# Patient Record
Sex: FEMALE | Race: BLACK OR AFRICAN AMERICAN | NOT HISPANIC OR LATINO | Employment: FULL TIME | URBAN - METROPOLITAN AREA
[De-identification: names, ages, dates, MRNs, and addresses within clinical notes are randomized per-mention and may not be internally consistent; named-entity substitution may affect disease eponyms.]

---

## 2019-07-30 DIAGNOSIS — R94.5 NONSPECIFIC ABNORMAL RESULTS OF LIVER FUNCTION STUDY: Primary | ICD-10-CM

## 2019-08-09 ENCOUNTER — HOSPITAL ENCOUNTER (OUTPATIENT)
Dept: RADIOLOGY | Facility: HOSPITAL | Age: 49
Discharge: HOME | End: 2019-08-09
Attending: FAMILY MEDICINE
Payer: MEDICAID

## 2019-08-09 DIAGNOSIS — R94.5 NONSPECIFIC ABNORMAL RESULTS OF LIVER FUNCTION STUDY: ICD-10-CM

## 2019-08-09 PROCEDURE — 76700 US ABDOMEN COMPLETE: ICD-10-PCS | Mod: 26,,, | Performed by: RADIOLOGY

## 2019-08-09 PROCEDURE — 76700 US EXAM ABDOM COMPLETE: CPT | Mod: TC,PO

## 2019-08-09 PROCEDURE — 76700 US EXAM ABDOM COMPLETE: CPT | Mod: 26,,, | Performed by: RADIOLOGY

## 2019-10-18 ENCOUNTER — PATIENT MESSAGE (OUTPATIENT)
Dept: FAMILY MEDICINE | Facility: CLINIC | Age: 49
End: 2019-10-18

## 2019-10-18 DIAGNOSIS — K76.0 HEPATIC STEATOSIS: ICD-10-CM

## 2019-10-18 DIAGNOSIS — E11.29 TYPE 2 DIABETES MELLITUS WITH OTHER DIABETIC KIDNEY COMPLICATION: ICD-10-CM

## 2019-10-18 DIAGNOSIS — E03.9 ACQUIRED HYPOTHYROIDISM: ICD-10-CM

## 2019-10-18 DIAGNOSIS — I10 ESSENTIAL HYPERTENSION: Primary | ICD-10-CM

## 2019-10-21 PROBLEM — E03.9 ACQUIRED HYPOTHYROIDISM: Status: ACTIVE | Noted: 2017-10-11

## 2019-10-21 PROBLEM — Z79.4 LONG TERM CURRENT USE OF INSULIN: Status: ACTIVE | Noted: 2017-10-11

## 2019-10-21 PROBLEM — B00.9 HSV-1 (HERPES SIMPLEX VIRUS 1) INFECTION: Status: ACTIVE | Noted: 2017-10-11

## 2019-10-21 PROBLEM — J30.2 OTHER SEASONAL ALLERGIC RHINITIS: Status: ACTIVE | Noted: 2017-10-11

## 2019-10-21 PROBLEM — E66.01 OBESITIES, MORBID: Status: ACTIVE | Noted: 2017-10-11

## 2019-10-21 PROBLEM — K76.0 HEPATIC STEATOSIS: Status: ACTIVE | Noted: 2019-08-13

## 2019-10-21 PROBLEM — F33.0 MAJOR DEPRESSIVE DISORDER, RECURRENT, MILD: Status: ACTIVE | Noted: 2017-10-11

## 2019-10-21 PROBLEM — I10 ESSENTIAL HYPERTENSION: Status: ACTIVE | Noted: 2017-10-11

## 2019-10-21 PROBLEM — E11.29 TYPE 2 DIABETES MELLITUS WITH OTHER DIABETIC KIDNEY COMPLICATION: Status: ACTIVE | Noted: 2018-03-29

## 2019-10-21 RX ORDER — INSULIN GLARGINE 100 [IU]/ML
60 INJECTION, SOLUTION SUBCUTANEOUS DAILY
COMMUNITY
Start: 2019-10-13 | End: 2019-12-11 | Stop reason: SDUPTHER

## 2019-10-21 RX ORDER — EMPAGLIFLOZIN 25 MG/1
1 TABLET, FILM COATED ORAL DAILY
COMMUNITY
Start: 2019-10-05 | End: 2019-12-11 | Stop reason: SDUPTHER

## 2019-10-21 RX ORDER — BUPROPION HYDROCHLORIDE 300 MG/1
TABLET ORAL
COMMUNITY
Start: 2019-10-13 | End: 2019-12-11 | Stop reason: SDUPTHER

## 2019-10-21 RX ORDER — LIRAGLUTIDE 6 MG/ML
INJECTION SUBCUTANEOUS
COMMUNITY
Start: 2019-09-25 | End: 2020-03-11

## 2019-10-21 RX ORDER — AMLODIPINE BESYLATE 5 MG/1
1 TABLET ORAL DAILY
COMMUNITY
Start: 2019-08-28 | End: 2019-10-21 | Stop reason: SDUPTHER

## 2019-10-21 RX ORDER — PEN NEEDLE, DIABETIC 31 GX5/16"
NEEDLE, DISPOSABLE MISCELLANEOUS
COMMUNITY
Start: 2019-09-15 | End: 2020-03-13 | Stop reason: SDUPTHER

## 2019-10-21 RX ORDER — AMLODIPINE BESYLATE 5 MG/1
5 TABLET ORAL DAILY
Qty: 30 TABLET | Refills: 0 | Status: SHIPPED | OUTPATIENT
Start: 2019-10-21 | End: 2019-11-25 | Stop reason: SDUPTHER

## 2019-10-21 RX ORDER — LEVOTHYROXINE SODIUM 50 UG/1
TABLET ORAL
COMMUNITY
Start: 2019-09-23 | End: 2019-12-11 | Stop reason: SDUPTHER

## 2019-10-21 RX ORDER — FLUTICASONE PROPIONATE 50 MCG
2 SPRAY, SUSPENSION (ML) NASAL
COMMUNITY

## 2019-10-21 RX ORDER — LOSARTAN POTASSIUM 100 MG/1
TABLET ORAL
COMMUNITY
Start: 2019-09-23 | End: 2019-11-05 | Stop reason: SDUPTHER

## 2019-10-21 RX ORDER — ERYTHROMYCIN 5 MG/G
OINTMENT OPHTHALMIC
COMMUNITY
Start: 2019-10-17 | End: 2019-12-11

## 2019-10-21 RX ORDER — BACLOFEN 10 MG/1
10 TABLET ORAL 3 TIMES DAILY
COMMUNITY
Start: 2019-10-13 | End: 2019-12-11 | Stop reason: SDUPTHER

## 2019-10-21 NOTE — TELEPHONE ENCOUNTER
Below message copied from previous encounter.  Unable to locate/open previous encounter.  To dr wisdom for review-->Good afternoon.     In preparation for my  appointment with Dr. Wisdom can you please [facility]?     Also, my Amlodopine prescription had one (1) refill remaining but it  at the end of September. I was supposed to fill it prior to its expiration but completely forgot and am now out of the medication. Is it possible to send a refill to [facility] ([place], [place]) although I will not see Dr. Wisdom for another two (2) weeks? Thank you so much.     Have a good afternoon.     -Demetrice

## 2019-10-31 ENCOUNTER — PATIENT MESSAGE (OUTPATIENT)
Dept: FAMILY MEDICINE | Facility: CLINIC | Age: 49
End: 2019-10-31

## 2019-11-05 DIAGNOSIS — I10 ESSENTIAL HYPERTENSION: Primary | ICD-10-CM

## 2019-11-05 RX ORDER — LOSARTAN POTASSIUM 100 MG/1
100 TABLET ORAL DAILY
Qty: 90 TABLET | Refills: 0 | Status: SHIPPED | OUTPATIENT
Start: 2019-11-05 | End: 2019-12-11 | Stop reason: SDUPTHER

## 2019-11-05 NOTE — TELEPHONE ENCOUNTER
prescription sent to   [id number] - [place] - [address]  [address]  [place] [address]  Phone: [phone] Fax: [phone]

## 2019-11-05 NOTE — TELEPHONE ENCOUNTER
----- Message from Deborah Patel sent at 11/5/2019  2:12 PM CST -----  Pt has an appointment for 12/11. She has run out of her medication for Losartan 100 mg. Please call an Rx in for her. [facility]

## 2019-11-20 ENCOUNTER — TELEPHONE (OUTPATIENT)
Dept: FAMILY MEDICINE | Facility: CLINIC | Age: 49
End: 2019-11-20

## 2019-11-20 NOTE — TELEPHONE ENCOUNTER
Spoke to patient that fasting lab is due prior to 12/11 ov. States she will go a week before visit.

## 2019-11-24 ENCOUNTER — PATIENT MESSAGE (OUTPATIENT)
Dept: FAMILY MEDICINE | Facility: CLINIC | Age: 49
End: 2019-11-24

## 2019-11-24 DIAGNOSIS — I10 ESSENTIAL HYPERTENSION: ICD-10-CM

## 2019-11-25 RX ORDER — AMLODIPINE BESYLATE 5 MG/1
5 TABLET ORAL DAILY
Qty: 30 TABLET | Refills: 0 | Status: SHIPPED | OUTPATIENT
Start: 2019-11-25 | End: 2019-12-11 | Stop reason: SDUPTHER

## 2019-11-25 NOTE — TELEPHONE ENCOUNTER
prescription sent to   [id number] - [place] - [address]  [address]  [place] [address]  Phone: [phone] Fax: [phone]

## 2019-12-10 LAB
ALBUMIN SERPL-MCNC: 4.9 G/DL (ref 3.5–5.5)
ALBUMIN/CREAT UR: 102 MG/G CREAT (ref 0–30)
ALBUMIN/GLOB SERPL: 1.7 {RATIO} (ref 1.2–2.2)
ALP SERPL-CCNC: 74 IU/L (ref 39–117)
ALT SERPL-CCNC: 106 IU/L (ref 0–32)
APPEARANCE UR: ABNORMAL
AST SERPL-CCNC: 67 IU/L (ref 0–40)
BACTERIA #/AREA URNS HPF: ABNORMAL /[HPF]
BILIRUB SERPL-MCNC: 0.6 MG/DL (ref 0–1.2)
BILIRUB UR QL STRIP: NEGATIVE
BUN SERPL-MCNC: 14 MG/DL (ref 6–24)
BUN/CREAT SERPL: 14 (ref 9–23)
CALCIUM SERPL-MCNC: 10.3 MG/DL (ref 8.7–10.2)
CHLORIDE SERPL-SCNC: 98 MMOL/L (ref 96–106)
CHOLEST SERPL-MCNC: 176 MG/DL (ref 100–199)
CO2 SERPL-SCNC: 23 MMOL/L (ref 20–29)
COLOR UR: YELLOW
CREAT SERPL-MCNC: 1 MG/DL (ref 0.57–1)
CREAT UR-MCNC: 121.2 MG/DL
EPI CELLS #/AREA URNS HPF: >10 /HPF (ref 0–10)
GLOBULIN SER CALC-MCNC: 2.9 G/DL (ref 1.5–4.5)
GLUCOSE SERPL-MCNC: 115 MG/DL (ref 65–99)
GLUCOSE UR QL: ABNORMAL
HDLC SERPL-MCNC: 72 MG/DL
HGB UR QL STRIP: NEGATIVE
KETONES UR QL STRIP: NEGATIVE
LDLC SERPL CALC-MCNC: 83 MG/DL (ref 0–99)
LEUKOCYTE ESTERASE UR QL STRIP: NEGATIVE
MICRO URNS: ABNORMAL
MICROALBUMIN UR-MCNC: 123.6 UG/ML
MUCOUS THREADS URNS QL MICRO: PRESENT
NITRITE UR QL STRIP: NEGATIVE
PH UR STRIP: 6.5 [PH] (ref 5–7.5)
POTASSIUM SERPL-SCNC: 4.2 MMOL/L (ref 3.5–5.2)
PROT SERPL-MCNC: 7.8 G/DL (ref 6–8.5)
PROT UR QL STRIP: ABNORMAL
RBC #/AREA URNS HPF: ABNORMAL /HPF (ref 0–2)
SODIUM SERPL-SCNC: 138 MMOL/L (ref 134–144)
SP GR UR: >=1.03 (ref 1–1.03)
T4 FREE SERPL-MCNC: 1.3 NG/DL (ref 0.82–1.77)
TRIGL SERPL-MCNC: 106 MG/DL (ref 0–149)
TSH SERPL DL<=0.005 MIU/L-ACNC: 3.81 UIU/ML (ref 0.45–4.5)
URINALYSIS REFLEX: ABNORMAL
UROBILINOGEN UR STRIP-MCNC: 0.2 MG/DL (ref 0.2–1)
VLDLC SERPL CALC-MCNC: 21 MG/DL (ref 5–40)
WBC #/AREA URNS HPF: ABNORMAL /HPF (ref 0–5)

## 2019-12-11 ENCOUNTER — OFFICE VISIT (OUTPATIENT)
Dept: FAMILY MEDICINE | Facility: CLINIC | Age: 49
End: 2019-12-11
Payer: MEDICAID

## 2019-12-11 VITALS
DIASTOLIC BLOOD PRESSURE: 60 MMHG | HEIGHT: 66 IN | BODY MASS INDEX: 45 KG/M2 | HEART RATE: 76 BPM | SYSTOLIC BLOOD PRESSURE: 124 MMHG | WEIGHT: 280 LBS

## 2019-12-11 DIAGNOSIS — I10 ESSENTIAL HYPERTENSION: ICD-10-CM

## 2019-12-11 DIAGNOSIS — Z79.4 LONG TERM CURRENT USE OF INSULIN: ICD-10-CM

## 2019-12-11 DIAGNOSIS — K76.0 HEPATIC STEATOSIS: ICD-10-CM

## 2019-12-11 DIAGNOSIS — E11.29 TYPE 2 DIABETES MELLITUS WITH OTHER DIABETIC KIDNEY COMPLICATION: Primary | ICD-10-CM

## 2019-12-11 DIAGNOSIS — F33.0 MAJOR DEPRESSIVE DISORDER, RECURRENT, MILD: ICD-10-CM

## 2019-12-11 DIAGNOSIS — R79.89 ELEVATED LFTS: ICD-10-CM

## 2019-12-11 DIAGNOSIS — E03.9 ACQUIRED HYPOTHYROIDISM: ICD-10-CM

## 2019-12-11 LAB — HBA1C MFR BLD: 8.5 %

## 2019-12-11 PROCEDURE — 99214 OFFICE O/P EST MOD 30 MIN: CPT | Mod: S$GLB,,, | Performed by: FAMILY MEDICINE

## 2019-12-11 PROCEDURE — 99214 PR OFFICE/OUTPT VISIT, EST, LEVL IV, 30-39 MIN: ICD-10-PCS | Mod: S$GLB,,, | Performed by: FAMILY MEDICINE

## 2019-12-11 PROCEDURE — 83036 HEMOGLOBIN GLYCOSYLATED A1C: CPT | Mod: QW,,, | Performed by: FAMILY MEDICINE

## 2019-12-11 PROCEDURE — 83036 POCT HEMOGLOBIN A1C: ICD-10-PCS | Mod: QW,,, | Performed by: FAMILY MEDICINE

## 2019-12-11 RX ORDER — INSULIN GLARGINE 100 [IU]/ML
60 INJECTION, SOLUTION SUBCUTANEOUS DAILY
Qty: 18 ML | Refills: 11 | Status: SHIPPED | OUTPATIENT
Start: 2019-12-11 | End: 2020-01-29 | Stop reason: SDUPTHER

## 2019-12-11 RX ORDER — LEVOTHYROXINE SODIUM 50 UG/1
50 TABLET ORAL
Qty: 30 TABLET | Refills: 12 | Status: SHIPPED | OUTPATIENT
Start: 2019-12-11 | End: 2020-05-11 | Stop reason: SDUPTHER

## 2019-12-11 RX ORDER — AMLODIPINE BESYLATE 5 MG/1
5 TABLET ORAL DAILY
Qty: 30 TABLET | Refills: 12 | Status: SHIPPED | OUTPATIENT
Start: 2019-12-11 | End: 2020-05-11 | Stop reason: SDUPTHER

## 2019-12-11 RX ORDER — EMPAGLIFLOZIN 25 MG/1
1 TABLET, FILM COATED ORAL DAILY
Qty: 30 TABLET | Refills: 12 | Status: SHIPPED | OUTPATIENT
Start: 2019-12-11 | End: 2020-05-11 | Stop reason: SDUPTHER

## 2019-12-11 RX ORDER — BACLOFEN 10 MG/1
10 TABLET ORAL 3 TIMES DAILY
Qty: 90 TABLET | Refills: 12 | Status: SHIPPED | OUTPATIENT
Start: 2019-12-11 | End: 2020-05-11 | Stop reason: SDUPTHER

## 2019-12-11 RX ORDER — LOSARTAN POTASSIUM 100 MG/1
50 TABLET ORAL 2 TIMES DAILY
Qty: 60 TABLET | Refills: 12 | Status: SHIPPED | OUTPATIENT
Start: 2019-12-11 | End: 2020-03-03 | Stop reason: SDUPTHER

## 2019-12-11 RX ORDER — BUPROPION HYDROCHLORIDE 300 MG/1
300 TABLET ORAL DAILY
Qty: 30 TABLET | Refills: 12 | Status: SHIPPED | OUTPATIENT
Start: 2019-12-11 | End: 2020-07-28 | Stop reason: SDUPTHER

## 2019-12-11 NOTE — PROGRESS NOTES
SUBJECTIVE:    Patient ID: Demetrice Palmer is a 49 y.o. female.    Chief Complaint: Regular Check Up; check ear; and discuss taking over meds from psych    Patient with hypertension and type 2 diabetes presents for visit.  Labs also reviewed does show elevated fasting blood sugar however renal function is normal.  Lipids are normal.  TSH is also normal.  Patient is hypothyroid on medication.  Her liver function tests still remain slightly elevated.  Hepatitis testing is negative.  Ultrasound only demonstrates a fatty liver.  Urine shows 3+ glucose but this is consistent with her use of jardiance  meds.  Positive microalbuminuria.  Diabetes controlled is still lacking.  Has been having issues with ear pain, this has been an issues for a while . Is secondary to blocked salivary glands. Has been sucking on sour candy and pain has improves  Has previously been seen in a psych program for drug rehab, has been clean and sober for 10 years and no longer feels that this program is meeting her needs.  She continues on her medications and has been stable.  Mood is stable.  Plans  to be entering the work force against symptoms.    Office Visit on 12/11/2019   Component Date Value Ref Range Status    Hemoglobin A1C 12/11/2019 8.5  % Final   Patient Message on 10/18/2019   Component Date Value Ref Range Status    Glucose 12/09/2019 115* 65 - 99 mg/dL Final    BUN, Bld 12/09/2019 14  6 - 24 mg/dL Final    Creatinine 12/09/2019 1.00  0.57 - 1.00 mg/dL Final    eGFR if non African American 12/09/2019 67  >59 mL/min/1.73 Final    eGFR if  12/09/2019 77  >59 mL/min/1.73 Final    BUN/Creatinine Ratio 12/09/2019 14  9 - 23 Final    Sodium 12/09/2019 138  134 - 144 mmol/L Final    Potassium 12/09/2019 4.2  3.5 - 5.2 mmol/L Final    Chloride 12/09/2019 98  96 - 106 mmol/L Final    CO2 12/09/2019 23  20 - 29 mmol/L Final    Calcium 12/09/2019 10.3* 8.7 - 10.2 mg/dL Final    Total Protein 12/09/2019 7.8   6.0 - 8.5 g/dL Final    Albumin 12/09/2019 4.9  3.5 - 5.5 g/dL Final    Globulin, Total 12/09/2019 2.9  1.5 - 4.5 g/dL Final    Albumin/Globulin Ratio 12/09/2019 1.7  1.2 - 2.2 Final    Total Bilirubin 12/09/2019 0.6  0.0 - 1.2 mg/dL Final    Alkaline Phosphatase 12/09/2019 74  39 - 117 IU/L Final    AST 12/09/2019 67* 0 - 40 IU/L Final    ALT 12/09/2019 106* 0 - 32 IU/L Final    Cholesterol 12/09/2019 176  100 - 199 mg/dL Final    Triglycerides 12/09/2019 106  0 - 149 mg/dL Final    HDL 12/09/2019 72  >39 mg/dL Final    VLDL Cholesterol Adam 12/09/2019 21  5 - 40 mg/dL Final    LDL Calculated 12/09/2019 83  0 - 99 mg/dL Final    TSH 12/09/2019 3.810  0.450 - 4.500 uIU/mL Final    T4, Free 12/09/2019 1.30  0.82 - 1.77 ng/dL Final    Creatinine, Random Ur 12/09/2019 121.2  Not Estab. mg/dL Final    Microalb, Ur 12/09/2019 123.6  Not Estab. ug/mL Final    Microalb/Crt. Ratio 12/09/2019 102.0* 0.0 - 30.0 mg/g creat Final    Specific Gravity, UA 12/09/2019 >=1.030* 1.005 - 1.030 Final    pH, UA 12/09/2019 6.5  5.0 - 7.5 Final    Color, UA 12/09/2019 Yellow  Yellow Final    Clarity, UA 12/09/2019 Cloudy* Clear Final    Leukocytes, UA 12/09/2019 Negative  Negative Final    Protein, UA 12/09/2019 1+* Negative/Trace Final    Glucose, UA 12/09/2019 3+* Negative Final    Ketones, UA 12/09/2019 Negative  Negative Final    Occult Blood UA 12/09/2019 Negative  Negative Final    Bilirubin, UA 12/09/2019 Negative  Negative Final    Urobilinogen, UA 12/09/2019 0.2  0.2 - 1.0 mg/dL Final    Nitrite, UA 12/09/2019 Negative  Negative Final    Microscopic Examination 12/09/2019 See below:   Final    Urinalysis Reflex 12/09/2019 Comment   Final    WBC, UA 12/09/2019 0-5  0 - 5 /hpf Final    RBC, UA 12/09/2019 0-2  0 - 2 /hpf Final    Epithelial Cells (non renal) 12/09/2019 >10* 0 - 10 /hpf Final    Mucus, UA 12/09/2019 Present  Not Estab. Final    Bacteria, UA 12/09/2019 None seen  None seen/Few Final  "    History reviewed. No pertinent past medical history.  History reviewed. No pertinent surgical history.  History reviewed. No pertinent family history.    Marital Status: Single  Alcohol History:  reports that she drinks alcohol.  Tobacco History:  reports that she quit smoking about 9 years ago. She has never used smokeless tobacco.  Drug History:  reports that she does not use drugs.    Review of patient's allergies indicates:   Allergen Reactions    Lisinopril      Other reaction(s): COUGH  Cough  Cough  Cough         Current Outpatient Medications:     amLODIPine (NORVASC) 5 MG tablet, Take 1 tablet (5 mg total) by mouth once daily., Disp: 30 tablet, Rfl: 12    baclofen (LIORESAL) 10 MG tablet, Take 1 tablet (10 mg total) by mouth 3 (three) times daily., Disp: 90 tablet, Rfl: 12    buPROPion (WELLBUTRIN XL) 300 MG 24 hr tablet, Take 1 tablet (300 mg total) by mouth once daily., Disp: 30 tablet, Rfl: 12    JARDIANCE 25 mg Tab, Take 1 tablet by mouth once daily., Disp: 30 tablet, Rfl: 12    LANTUS SOLOSTAR U-100 INSULIN glargine 100 units/mL (3mL) SubQ pen, Inject 60 Units into the skin once daily., Disp: 18 mL, Rfl: 11    levothyroxine (SYNTHROID) 50 MCG tablet, Take 1 tablet (50 mcg total) by mouth before breakfast., Disp: 30 tablet, Rfl: 12    losartan (COZAAR) 100 MG tablet, Take 0.5 tablets (50 mg total) by mouth 2 (two) times daily., Disp: 60 tablet, Rfl: 12    VICTOZA 3-GILMER 0.6 mg/0.1 mL (18 mg/3 mL) PnIj, , Disp: , Rfl:     BD ULTRA-FINE JENNIFER PEN NEEDLE 32 gauge x 5/32" Ndle, , Disp: , Rfl:     fluticasone propionate (FLONASE ALLERGY RELIEF) 50 mcg/actuation nasal spray, , Disp: , Rfl:     Review of Systems   Constitutional: Negative for activity change, fatigue and unexpected weight change.   HENT: Positive for ear pain. Negative for hearing loss, postnasal drip, sinus pressure, sore throat and voice change.    Eyes: Negative for photophobia and visual disturbance.   Respiratory: Negative " "for cough, shortness of breath and wheezing.    Cardiovascular: Negative for chest pain and palpitations.   Gastrointestinal: Negative for constipation, diarrhea and nausea.   Genitourinary: Negative for difficulty urinating, frequency, hematuria and urgency.   Musculoskeletal: Negative for arthralgias and back pain.   Skin: Negative for rash.   Neurological: Negative for weakness, light-headedness and headaches.   Hematological: Negative for adenopathy. Does not bruise/bleed easily.   Psychiatric/Behavioral: The patient is not nervous/anxious.           Objective:      Vitals:    12/11/19 1543   BP: 124/60   Pulse: 76   Weight: 127 kg (280 lb)   Height: 5' 5.5" (1.664 m)     Physical Exam   Constitutional: She is oriented to person, place, and time. Vital signs are normal. She appears well-developed. No distress.   obese   HENT:   Head: Normocephalic and atraumatic.   Right Ear: Tympanic membrane and external ear normal.   Left Ear: Tympanic membrane and external ear normal.   Eyes: Pupils are equal, round, and reactive to light. Conjunctivae, EOM and lids are normal.   Neck: Full passive range of motion without pain. Neck supple. No JVD present. No tracheal deviation present. No thyromegaly present.   Cardiovascular: Normal rate and regular rhythm. PMI is not displaced.   Pulmonary/Chest: Effort normal and breath sounds normal.   Abdominal: Soft. Bowel sounds are normal. There is no hepatosplenomegaly. There is no tenderness. There is no rebound and no guarding.   Musculoskeletal: Normal range of motion. She exhibits no edema or tenderness.   Neurological: She is alert and oriented to person, place, and time.   Skin: Skin is warm and dry. No rash noted.   Psychiatric: She has a normal mood and affect.   Vitals reviewed.        Assessment:       1. Type 2 diabetes mellitus with other diabetic kidney complication    2. Acquired hypothyroidism    3. Essential hypertension    4. Hepatic steatosis    5. Long term " current use of insulin    6. Major depressive disorder, recurrent, mild    7. Elevated LFTs         Plan:       Type 2 diabetes mellitus with other diabetic kidney complication  -     Hemoglobin A1C, POCT  -     JARDIANCE 25 mg Tab; Take 1 tablet by mouth once daily.  Dispense: 30 tablet; Refill: 12  -     LANTUS SOLOSTAR U-100 INSULIN glargine 100 units/mL (3mL) SubQ pen; Inject 60 Units into the skin once daily.  Dispense: 18 mL; Refill: 11    Acquired hypothyroidism  -     levothyroxine (SYNTHROID) 50 MCG tablet; Take 1 tablet (50 mcg total) by mouth before breakfast.  Dispense: 30 tablet; Refill: 12    Essential hypertension  -     losartan (COZAAR) 100 MG tablet; Take 0.5 tablets (50 mg total) by mouth 2 (two) times daily.  Dispense: 60 tablet; Refill: 12  -     amLODIPine (NORVASC) 5 MG tablet; Take 1 tablet (5 mg total) by mouth once daily.  Dispense: 30 tablet; Refill: 12    Hepatic steatosis    Long term current use of insulin    Major depressive disorder, recurrent, mild  -     baclofen (LIORESAL) 10 MG tablet; Take 1 tablet (10 mg total) by mouth 3 (three) times daily.  Dispense: 90 tablet; Refill: 12  -     buPROPion (WELLBUTRIN XL) 300 MG 24 hr tablet; Take 1 tablet (300 mg total) by mouth once daily.  Dispense: 30 tablet; Refill: 12    Elevated LFTs      Follow up in about 3 months (around 3/11/2020) for Diabetic Check-Up.

## 2020-01-29 DIAGNOSIS — E11.29 TYPE 2 DIABETES MELLITUS WITH OTHER DIABETIC KIDNEY COMPLICATION: ICD-10-CM

## 2020-01-29 RX ORDER — INSULIN GLARGINE 100 [IU]/ML
60 INJECTION, SOLUTION SUBCUTANEOUS DAILY
Qty: 18 ML | Refills: 11 | Status: SHIPPED | OUTPATIENT
Start: 2020-01-29 | End: 2020-05-11 | Stop reason: SDUPTHER

## 2020-01-29 NOTE — TELEPHONE ENCOUNTER
----- Message from Ruben Khan sent at 1/29/2020 11:05 AM CST -----  adalberto solis calling pt is needing refills   [facility]   Pharm tech [phone]

## 2020-03-02 ENCOUNTER — PATIENT MESSAGE (OUTPATIENT)
Dept: FAMILY MEDICINE | Facility: CLINIC | Age: 50
End: 2020-03-02

## 2020-03-03 DIAGNOSIS — I10 ESSENTIAL HYPERTENSION: ICD-10-CM

## 2020-03-03 RX ORDER — LOSARTAN POTASSIUM 100 MG/1
50 TABLET ORAL 2 TIMES DAILY
Qty: 60 TABLET | Refills: 12 | Status: SHIPPED | OUTPATIENT
Start: 2020-03-03 | End: 2020-03-10 | Stop reason: RX

## 2020-03-03 RX ORDER — LOSARTAN POTASSIUM 100 MG/1
50 TABLET ORAL 2 TIMES DAILY
Qty: 60 TABLET | Refills: 12 | Status: CANCELLED | OUTPATIENT
Start: 2020-03-03

## 2020-03-10 ENCOUNTER — TELEPHONE (OUTPATIENT)
Dept: FAMILY MEDICINE | Facility: CLINIC | Age: 50
End: 2020-03-10

## 2020-03-10 DIAGNOSIS — I10 ESSENTIAL HYPERTENSION: Primary | ICD-10-CM

## 2020-03-10 RX ORDER — OLMESARTAN MEDOXOMIL 40 MG/1
40 TABLET ORAL DAILY
Qty: 30 TABLET | Refills: 3 | Status: SHIPPED | OUTPATIENT
Start: 2020-03-10 | End: 2020-03-11 | Stop reason: ALTCHOICE

## 2020-03-10 NOTE — PROGRESS NOTES
Subjective:       Patient ID: Demetrice Palmer is a 49 y.o. female.    Chief Complaint: DM Check Up / A1C / Foot Check; mammogram order; and insurance no longer covering victoza    Patient with hypertension, hypothyroidism and type 2 diabetes on insulin presents for check.  Diabetes does show some improved control however she is not yet at goal.  She needs some changes in her therapy secondary to insurance coverage.  Her most recent labs reviewed in her visit are significant for slightly elevated blood sugar.  Patient reports that she is doing well otherwise.     Office Visit on 03/11/2020   Component Date Value Ref Range Status    Hemoglobin A1C 03/11/2020 8.0  % Final   Office Visit on 12/11/2019   Component Date Value Ref Range Status    Hemoglobin A1C 12/11/2019 8.5  % Final   Patient Message on 10/18/2019   Component Date Value Ref Range Status    Glucose 12/09/2019 115* 65 - 99 mg/dL Final    BUN, Bld 12/09/2019 14  6 - 24 mg/dL Final    Creatinine 12/09/2019 1.00  0.57 - 1.00 mg/dL Final    eGFR if non African American 12/09/2019 67  >59 mL/min/1.73 Final    eGFR if  12/09/2019 77  >59 mL/min/1.73 Final    BUN/Creatinine Ratio 12/09/2019 14  9 - 23 Final    Sodium 12/09/2019 138  134 - 144 mmol/L Final    Potassium 12/09/2019 4.2  3.5 - 5.2 mmol/L Final    Chloride 12/09/2019 98  96 - 106 mmol/L Final    CO2 12/09/2019 23  20 - 29 mmol/L Final    Calcium 12/09/2019 10.3* 8.7 - 10.2 mg/dL Final    Total Protein 12/09/2019 7.8  6.0 - 8.5 g/dL Final    Albumin 12/09/2019 4.9  3.5 - 5.5 g/dL Final    Globulin, Total 12/09/2019 2.9  1.5 - 4.5 g/dL Final    Albumin/Globulin Ratio 12/09/2019 1.7  1.2 - 2.2 Final    Total Bilirubin 12/09/2019 0.6  0.0 - 1.2 mg/dL Final    Alkaline Phosphatase 12/09/2019 74  39 - 117 IU/L Final    AST 12/09/2019 67* 0 - 40 IU/L Final    ALT 12/09/2019 106* 0 - 32 IU/L Final    Cholesterol 12/09/2019 176  100 - 199 mg/dL Final    Triglycerides  12/09/2019 106  0 - 149 mg/dL Final    HDL 12/09/2019 72  >39 mg/dL Final    VLDL Cholesterol Adam 12/09/2019 21  5 - 40 mg/dL Final    LDL Calculated 12/09/2019 83  0 - 99 mg/dL Final    TSH 12/09/2019 3.810  0.450 - 4.500 uIU/mL Final    T4, Free 12/09/2019 1.30  0.82 - 1.77 ng/dL Final    Creatinine, Random Ur 12/09/2019 121.2  Not Estab. mg/dL Final    Microalb, Ur 12/09/2019 123.6  Not Estab. ug/mL Final    Microalb/Crt. Ratio 12/09/2019 102.0* 0.0 - 30.0 mg/g creat Final    Specific Gravity, UA 12/09/2019 >=1.030* 1.005 - 1.030 Final    pH, UA 12/09/2019 6.5  5.0 - 7.5 Final    Color, UA 12/09/2019 Yellow  Yellow Final    Clarity, UA 12/09/2019 Cloudy* Clear Final    Leukocytes, UA 12/09/2019 Negative  Negative Final    Protein, UA 12/09/2019 1+* Negative/Trace Final    Glucose, UA 12/09/2019 3+* Negative Final    Ketones, UA 12/09/2019 Negative  Negative Final    Occult Blood UA 12/09/2019 Negative  Negative Final    Bilirubin, UA 12/09/2019 Negative  Negative Final    Urobilinogen, UA 12/09/2019 0.2  0.2 - 1.0 mg/dL Final    Nitrite, UA 12/09/2019 Negative  Negative Final    Microscopic Examination 12/09/2019 See below:   Final    Urinalysis Reflex 12/09/2019 Comment   Final    WBC, UA 12/09/2019 0-5  0 - 5 /hpf Final    RBC, UA 12/09/2019 0-2  0 - 2 /hpf Final    Epithelial Cells (non renal) 12/09/2019 >10* 0 - 10 /hpf Final    Mucus, UA 12/09/2019 Present  Not Estab. Final    Bacteria, UA 12/09/2019 None seen  None seen/Few Final       Past Medical History:   Diagnosis Date    History of avascular necrosis of capital femoral epiphysis      Past Surgical History:   Procedure Laterality Date    HIP ARTHROPLASTY Right 2007    avascular necrosis    TONSILLECTOMY      UVULOPALATOPHARYNGOPLASTY AND SEPTOPLASTY       Family History   Problem Relation Age of Onset    Hypertension Father     Diabetes Father     Peripheral vascular disease Father     Prostate cancer Father      "Multiple sclerosis Sister        Marital Status: Single  Alcohol History:  reports that she drinks alcohol.  Tobacco History:  reports that she quit smoking about 9 years ago. She has never used smokeless tobacco.  Drug History:  reports that she does not use drugs.    Review of patient's allergies indicates:   Allergen Reactions    Lisinopril      Other reaction(s): COUGH  Cough  Cough  Cough         Current Outpatient Medications:     amLODIPine (NORVASC) 5 MG tablet, Take 1 tablet (5 mg total) by mouth once daily., Disp: 30 tablet, Rfl: 12    baclofen (LIORESAL) 10 MG tablet, Take 1 tablet (10 mg total) by mouth 3 (three) times daily., Disp: 90 tablet, Rfl: 12    buPROPion (WELLBUTRIN XL) 300 MG 24 hr tablet, Take 1 tablet (300 mg total) by mouth once daily., Disp: 30 tablet, Rfl: 12    JARDIANCE 25 mg Tab, Take 1 tablet by mouth once daily., Disp: 30 tablet, Rfl: 12    LANTUS SOLOSTAR U-100 INSULIN glargine 100 units/mL (3mL) SubQ pen, Inject 60 Units into the skin once daily., Disp: 18 mL, Rfl: 11    levothyroxine (SYNTHROID) 50 MCG tablet, Take 1 tablet (50 mcg total) by mouth before breakfast., Disp: 30 tablet, Rfl: 12    losartan (COZAAR) 100 MG tablet, Take 50 mg by mouth 2 (two) times daily., Disp: , Rfl:     BD ULTRA-FINE JENNIFER PEN NEEDLE 32 gauge x 5/32" Ndle, Use with insulin once daily, Disp: 100 each, Rfl: 5    fluticasone propionate (FLONASE ALLERGY RELIEF) 50 mcg/actuation nasal spray, , Disp: , Rfl:     semaglutide (OZEMPIC) 1 mg/dose (2 mg/1.5 mL) PnIj, Inject 0.75 mLs into the skin every 7 days., Disp: 3 mL, Rfl: 12    Review of Systems   Constitutional: Negative for activity change, fatigue and unexpected weight change.   HENT: Negative for hearing loss, postnasal drip, sinus pressure, sore throat and voice change.    Eyes: Negative for photophobia and visual disturbance.   Respiratory: Negative for cough, shortness of breath and wheezing.    Cardiovascular: Negative for chest pain and " "palpitations.   Gastrointestinal: Negative for constipation, diarrhea and nausea.   Genitourinary: Negative for difficulty urinating, frequency, hematuria and urgency.   Musculoskeletal: Negative for arthralgias and back pain.   Skin: Negative for rash.   Neurological: Negative for weakness, light-headedness and headaches.   Hematological: Negative for adenopathy. Does not bruise/bleed easily.   Psychiatric/Behavioral: The patient is not nervous/anxious.           Objective:      Vitals:    03/11/20 1620   BP: 130/72   Pulse: 84   Weight: 124.3 kg (274 lb)   Height: 5' 5.5" (1.664 m)     Body mass index is 44.9 kg/m².  Physical Exam   Constitutional: She is oriented to person, place, and time. Vital signs are normal. She appears well-developed and well-nourished. No distress.   HENT:   Head: Normocephalic and atraumatic.   Right Ear: Tympanic membrane and external ear normal.   Left Ear: Tympanic membrane and external ear normal.   Eyes: Pupils are equal, round, and reactive to light. Conjunctivae, EOM and lids are normal.   Neck: Full passive range of motion without pain. Neck supple. No JVD present. No tracheal deviation present. No thyromegaly present.   Cardiovascular: Normal rate and regular rhythm. PMI is not displaced.   Pulmonary/Chest: Effort normal and breath sounds normal.   Abdominal: Soft. Bowel sounds are normal. There is no hepatosplenomegaly. There is no tenderness. There is no rebound and no guarding.   Musculoskeletal: Normal range of motion. She exhibits no edema or tenderness.   Neurological: She is alert and oriented to person, place, and time.   Skin: Skin is warm and dry. No rash noted.   Psychiatric: She has a normal mood and affect.   Vitals reviewed.        LEFT: normal DP and PT pulses, no trophic changes or ulcerative lesions, normal sensory exam and normal monofilament exam    RIGHT: normal DP and PT pulses, no trophic changes or ulcerative lesions, normal sensory exam and normal " monofilament exam    Assessment:       1. Type 2 diabetes mellitus with other diabetic kidney complication    2. Acquired hypothyroidism    3. Essential hypertension    4. Long term current use of insulin    5. Major depressive disorder, recurrent, mild        Plan:       Type 2 diabetes mellitus with other diabetic kidney complication  -     Hemoglobin A1C, POCT  -     semaglutide (OZEMPIC) 1 mg/dose (2 mg/1.5 mL) PnIj; Inject 0.75 mLs into the skin every 7 days.  Dispense: 3 mL; Refill: 12    Acquired hypothyroidism  Comments:  Stable on medications    Essential hypertension  Comments:  Controlled    Long term current use of insulin    Major depressive disorder, recurrent, mild  Comments:  Managing well on current therapy      Follow up in about 3 months (around 6/11/2020) for Diabetic Check-Up.        3/13/2020 Angel Kennedy M.D.

## 2020-03-11 ENCOUNTER — OFFICE VISIT (OUTPATIENT)
Dept: FAMILY MEDICINE | Facility: CLINIC | Age: 50
End: 2020-03-11
Payer: COMMERCIAL

## 2020-03-11 VITALS
HEART RATE: 84 BPM | HEIGHT: 66 IN | SYSTOLIC BLOOD PRESSURE: 130 MMHG | DIASTOLIC BLOOD PRESSURE: 72 MMHG | WEIGHT: 274 LBS | BODY MASS INDEX: 44.03 KG/M2

## 2020-03-11 DIAGNOSIS — I10 ESSENTIAL HYPERTENSION: ICD-10-CM

## 2020-03-11 DIAGNOSIS — Z79.4 LONG TERM CURRENT USE OF INSULIN: ICD-10-CM

## 2020-03-11 DIAGNOSIS — F33.0 MAJOR DEPRESSIVE DISORDER, RECURRENT, MILD: ICD-10-CM

## 2020-03-11 DIAGNOSIS — E03.9 ACQUIRED HYPOTHYROIDISM: ICD-10-CM

## 2020-03-11 DIAGNOSIS — E11.29 TYPE 2 DIABETES MELLITUS WITH OTHER DIABETIC KIDNEY COMPLICATION: Primary | ICD-10-CM

## 2020-03-11 LAB — HBA1C MFR BLD: 8 %

## 2020-03-11 PROCEDURE — 99214 OFFICE O/P EST MOD 30 MIN: CPT | Mod: S$GLB,,, | Performed by: FAMILY MEDICINE

## 2020-03-11 PROCEDURE — 99214 PR OFFICE/OUTPT VISIT, EST, LEVL IV, 30-39 MIN: ICD-10-PCS | Mod: S$GLB,,, | Performed by: FAMILY MEDICINE

## 2020-03-11 PROCEDURE — 83036 HEMOGLOBIN GLYCOSYLATED A1C: CPT | Mod: QW,,, | Performed by: FAMILY MEDICINE

## 2020-03-11 PROCEDURE — 83036 POCT HEMOGLOBIN A1C: ICD-10-PCS | Mod: QW,,, | Performed by: FAMILY MEDICINE

## 2020-03-11 RX ORDER — LOSARTAN POTASSIUM 100 MG/1
50 TABLET ORAL 2 TIMES DAILY
COMMUNITY
End: 2020-05-11 | Stop reason: SDUPTHER

## 2020-03-13 ENCOUNTER — PATIENT MESSAGE (OUTPATIENT)
Dept: FAMILY MEDICINE | Facility: CLINIC | Age: 50
End: 2020-03-13

## 2020-03-13 DIAGNOSIS — E11.29 TYPE 2 DIABETES MELLITUS WITH OTHER DIABETIC KIDNEY COMPLICATION: Primary | ICD-10-CM

## 2020-03-13 RX ORDER — PEN NEEDLE, DIABETIC 31 GX5/16"
NEEDLE, DISPOSABLE MISCELLANEOUS
Qty: 100 EACH | Refills: 5 | Status: SHIPPED | OUTPATIENT
Start: 2020-03-13 | End: 2020-07-28 | Stop reason: SDUPTHER

## 2020-03-16 ENCOUNTER — TELEPHONE (OUTPATIENT)
Dept: FAMILY MEDICINE | Facility: CLINIC | Age: 50
End: 2020-03-16

## 2020-03-16 DIAGNOSIS — E11.29 TYPE 2 DIABETES MELLITUS WITH OTHER DIABETIC KIDNEY COMPLICATION: Primary | ICD-10-CM

## 2020-03-16 NOTE — TELEPHONE ENCOUNTER
Informed patient that her insurance does not cover ozempic. Continue to use the sample I gave her. When done start weekly trulicity. It shold give her the same results. Only difference is trulicity is not a reusable pen. When she takes the dose she throws the whole pen away

## 2020-03-16 NOTE — TELEPHONE ENCOUNTER
----- Message from Lindy Jett MA sent at 3/13/2020 11:35 AM CDT -----      ----- Message -----  From: Ruben Khan  Sent: 3/13/2020  11:32 AM CDT  To: Angel Kennedy Staff    ALT for the med ozempic     METFORMIN HCL ER TABS 500MG    METFORMIN HCL ER TABS 750MG   BYETTA INJECT/PEN 5MCG .   BYETTA INJECT/PEN 10MCG .   BYDUREON PEN INJECTOR 4'S 2MG   TRULICITY SD PEN 0.5ML 4'S 0. 0.75MG   TRULICITY SD PEN 0.5ML 4'S 1. 1.5MG    BYDUREON BCISE AUTOIJ 0.85ML4 2MG

## 2020-03-17 NOTE — TELEPHONE ENCOUNTER
Spoke with patient notified of the below message per dr wisdom. Patient verbalized understanding, informed patient to return call with questions/concerns -DN

## 2020-03-18 ENCOUNTER — PATIENT MESSAGE (OUTPATIENT)
Dept: FAMILY MEDICINE | Facility: CLINIC | Age: 50
End: 2020-03-18

## 2020-03-20 ENCOUNTER — TELEPHONE (OUTPATIENT)
Dept: FAMILY MEDICINE | Facility: CLINIC | Age: 50
End: 2020-03-20

## 2020-03-20 NOTE — TELEPHONE ENCOUNTER
----- Message from Lindy Jett MA sent at 3/18/2020  2:00 PM CDT -----      ----- Message -----  From: Azalea Beth  Sent: 3/18/2020  10:13 AM CDT  To: Angel Kennedy Staff    Med PA 3/18/20

## 2020-04-21 ENCOUNTER — PATIENT MESSAGE (OUTPATIENT)
Dept: FAMILY MEDICINE | Facility: CLINIC | Age: 50
End: 2020-04-21

## 2020-04-22 ENCOUNTER — TELEPHONE (OUTPATIENT)
Dept: FAMILY MEDICINE | Facility: CLINIC | Age: 50
End: 2020-04-22

## 2020-04-22 NOTE — TELEPHONE ENCOUNTER
----- Message from Michael Shore sent at 4/22/2020 11:00 AM CDT -----  Contact: Demetrice Palmer  Pt needs a Prior auth on her Jardiance. She needs a refill on it as well please.   Send to [facility] in [place]  Pt#

## 2020-04-27 ENCOUNTER — PATIENT MESSAGE (OUTPATIENT)
Dept: FAMILY MEDICINE | Facility: CLINIC | Age: 50
End: 2020-04-27

## 2020-04-29 DIAGNOSIS — E11.29 TYPE 2 DIABETES MELLITUS WITH OTHER DIABETIC KIDNEY COMPLICATION: ICD-10-CM

## 2020-05-11 DIAGNOSIS — E11.29 TYPE 2 DIABETES MELLITUS WITH OTHER DIABETIC KIDNEY COMPLICATION: ICD-10-CM

## 2020-05-11 DIAGNOSIS — E03.9 ACQUIRED HYPOTHYROIDISM: ICD-10-CM

## 2020-05-11 DIAGNOSIS — I10 ESSENTIAL HYPERTENSION: ICD-10-CM

## 2020-05-11 DIAGNOSIS — F33.0 MAJOR DEPRESSIVE DISORDER, RECURRENT, MILD: ICD-10-CM

## 2020-05-11 RX ORDER — INSULIN GLARGINE 100 [IU]/ML
60 INJECTION, SOLUTION SUBCUTANEOUS DAILY
Qty: 54 ML | Refills: 1 | Status: SHIPPED | OUTPATIENT
Start: 2020-05-11 | End: 2020-08-05 | Stop reason: SDUPTHER

## 2020-05-11 RX ORDER — EMPAGLIFLOZIN 25 MG/1
1 TABLET, FILM COATED ORAL DAILY
Qty: 90 TABLET | Refills: 1 | Status: SHIPPED | OUTPATIENT
Start: 2020-05-11 | End: 2020-11-10 | Stop reason: SDUPTHER

## 2020-05-11 RX ORDER — AMLODIPINE BESYLATE 5 MG/1
5 TABLET ORAL DAILY
Qty: 90 TABLET | Refills: 1 | Status: SHIPPED | OUTPATIENT
Start: 2020-05-11 | End: 2020-11-10 | Stop reason: SDUPTHER

## 2020-05-11 RX ORDER — LOSARTAN POTASSIUM 100 MG/1
50 TABLET ORAL 2 TIMES DAILY
Qty: 90 TABLET | Refills: 1 | Status: SHIPPED | OUTPATIENT
Start: 2020-05-11 | End: 2020-11-10 | Stop reason: SDUPTHER

## 2020-05-11 RX ORDER — LEVOTHYROXINE SODIUM 50 UG/1
50 TABLET ORAL
Qty: 90 TABLET | Refills: 1 | Status: SHIPPED | OUTPATIENT
Start: 2020-05-11 | End: 2020-11-10 | Stop reason: SDUPTHER

## 2020-05-11 RX ORDER — BACLOFEN 10 MG/1
10 TABLET ORAL 3 TIMES DAILY
Qty: 270 TABLET | Refills: 1 | Status: SHIPPED | OUTPATIENT
Start: 2020-05-11 | End: 2020-11-10 | Stop reason: SDUPTHER

## 2020-05-11 NOTE — TELEPHONE ENCOUNTER
----- Message from Maddie Stearns sent at 5/11/2020  2:19 PM CDT -----  Contact: [facility]   Requesting 90 day supply with 3 refills Jacy Mccallum Star Pen 100 units/ml  Also has an Rx under Dr. Sam Alcazar Plus Pen Needles 32 gauge qty 100.  Ref # [id number]    # [phone] fax # [phone]

## 2020-05-11 NOTE — TELEPHONE ENCOUNTER
Spoke with patient states insurance now requires her to use [facility].  Requests to have all rxs sent. -JENNIFER

## 2020-07-28 DIAGNOSIS — F33.0 MAJOR DEPRESSIVE DISORDER, RECURRENT, MILD: ICD-10-CM

## 2020-07-28 DIAGNOSIS — E11.29 TYPE 2 DIABETES MELLITUS WITH OTHER DIABETIC KIDNEY COMPLICATION: ICD-10-CM

## 2020-07-28 RX ORDER — PEN NEEDLE, DIABETIC 31 GX5/16"
NEEDLE, DISPOSABLE MISCELLANEOUS
Qty: 300 EACH | Refills: 2 | Status: SHIPPED | OUTPATIENT
Start: 2020-07-28 | End: 2021-09-03 | Stop reason: SDUPTHER

## 2020-07-28 RX ORDER — BUPROPION HYDROCHLORIDE 300 MG/1
300 TABLET ORAL DAILY
Qty: 90 TABLET | Refills: 3 | Status: SHIPPED | OUTPATIENT
Start: 2020-07-28 | End: 2021-07-14 | Stop reason: SDUPTHER

## 2020-07-28 NOTE — TELEPHONE ENCOUNTER
----- Message from Deborah Patel sent at 7/28/2020 10:18 AM CDT -----  Contact: [employer]  Refill for Bupropion HCL  mg tabs. [facility] mail order. #[phone]

## 2020-07-28 NOTE — TELEPHONE ENCOUNTER
----- Message from Maddie Stearns sent at 7/28/2020 10:24 AM CDT -----  Pancho with [facility] calling for refills on the True Plus Pen needles 32G5/32  once pack has a qty of 100. They are requesting 3 month month so 3 boxes total.  # [phone].

## 2020-08-05 ENCOUNTER — OFFICE VISIT (OUTPATIENT)
Dept: FAMILY MEDICINE | Facility: CLINIC | Age: 50
End: 2020-08-05
Payer: COMMERCIAL

## 2020-08-05 VITALS
TEMPERATURE: 98 F | DIASTOLIC BLOOD PRESSURE: 52 MMHG | WEIGHT: 280 LBS | HEIGHT: 66 IN | SYSTOLIC BLOOD PRESSURE: 114 MMHG | BODY MASS INDEX: 45 KG/M2 | HEART RATE: 84 BPM

## 2020-08-05 DIAGNOSIS — E03.9 ACQUIRED HYPOTHYROIDISM: ICD-10-CM

## 2020-08-05 DIAGNOSIS — I10 ESSENTIAL HYPERTENSION: ICD-10-CM

## 2020-08-05 DIAGNOSIS — Z79.4 LONG TERM CURRENT USE OF INSULIN: ICD-10-CM

## 2020-08-05 DIAGNOSIS — E11.29 TYPE 2 DIABETES MELLITUS WITH OTHER DIABETIC KIDNEY COMPLICATION: Primary | ICD-10-CM

## 2020-08-05 DIAGNOSIS — R60.0 MILD PERIPHERAL EDEMA: ICD-10-CM

## 2020-08-05 LAB — HBA1C MFR BLD: 6.9 %

## 2020-08-05 PROCEDURE — 99214 PR OFFICE/OUTPT VISIT, EST, LEVL IV, 30-39 MIN: ICD-10-PCS | Mod: S$GLB,,, | Performed by: FAMILY MEDICINE

## 2020-08-05 PROCEDURE — 3008F BODY MASS INDEX DOCD: CPT | Mod: S$GLB,,, | Performed by: FAMILY MEDICINE

## 2020-08-05 PROCEDURE — 3074F SYST BP LT 130 MM HG: CPT | Mod: S$GLB,,, | Performed by: FAMILY MEDICINE

## 2020-08-05 PROCEDURE — 3044F PR MOST RECENT HEMOGLOBIN A1C LEVEL <7.0%: ICD-10-PCS | Mod: S$GLB,,, | Performed by: FAMILY MEDICINE

## 2020-08-05 PROCEDURE — 3044F HG A1C LEVEL LT 7.0%: CPT | Mod: S$GLB,,, | Performed by: FAMILY MEDICINE

## 2020-08-05 PROCEDURE — 3008F PR BODY MASS INDEX (BMI) DOCUMENTED: ICD-10-PCS | Mod: S$GLB,,, | Performed by: FAMILY MEDICINE

## 2020-08-05 PROCEDURE — 83036 HEMOGLOBIN GLYCOSYLATED A1C: CPT | Mod: QW,,, | Performed by: FAMILY MEDICINE

## 2020-08-05 PROCEDURE — 99214 OFFICE O/P EST MOD 30 MIN: CPT | Mod: S$GLB,,, | Performed by: FAMILY MEDICINE

## 2020-08-05 PROCEDURE — 3078F PR MOST RECENT DIASTOLIC BLOOD PRESSURE < 80 MM HG: ICD-10-PCS | Mod: S$GLB,,, | Performed by: FAMILY MEDICINE

## 2020-08-05 PROCEDURE — 83036 POCT HEMOGLOBIN A1C: ICD-10-PCS | Mod: QW,,, | Performed by: FAMILY MEDICINE

## 2020-08-05 PROCEDURE — 3078F DIAST BP <80 MM HG: CPT | Mod: S$GLB,,, | Performed by: FAMILY MEDICINE

## 2020-08-05 PROCEDURE — 3074F PR MOST RECENT SYSTOLIC BLOOD PRESSURE < 130 MM HG: ICD-10-PCS | Mod: S$GLB,,, | Performed by: FAMILY MEDICINE

## 2020-08-05 RX ORDER — INSULIN GLARGINE 100 [IU]/ML
60 INJECTION, SOLUTION SUBCUTANEOUS DAILY
Qty: 54 ML | Refills: 1 | Status: SHIPPED | OUTPATIENT
Start: 2020-08-05 | End: 2020-11-10 | Stop reason: SDUPTHER

## 2020-08-05 NOTE — PROGRESS NOTES
"  SUBJECTIVE:    Patient ID: Demetrice Palmer is a 50 y.o. female.    Chief Complaint: DM Check Up / A1C and Leg Swelling    Patient with DM and HTN  . Sugars are well controlled on current therapy.  She has had significant improvement in her A1c since last visit.  Currently complains swelling in her ankles and some joint pain.  Notes that has been going on since she started a new job that is very sedentary.  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  She also has a daily to hour commute.  History of depression but feels that her mood is doing well.  She states that she feels satisfied work.  Noted that blood pressure has been slightly decreased.  Does not report any dizziness       Office Visit on 08/05/2020   Component Date Value Ref Range Status    Hemoglobin A1C 08/05/2020 6.9  % Final   Office Visit on 03/11/2020   Component Date Value Ref Range Status    Hemoglobin A1C 03/11/2020 8.0  % Final       Past Medical History:   Diagnosis Date    History of avascular necrosis of capital femoral epiphysis      Past Surgical History:   Procedure Laterality Date    HIP ARTHROPLASTY Right 2007    avascular necrosis    TONSILLECTOMY      UVULOPALATOPHARYNGOPLASTY AND SEPTOPLASTY       Family History   Problem Relation Age of Onset    Hypertension Father     Diabetes Father     Peripheral vascular disease Father     Prostate cancer Father     Multiple sclerosis Sister        Marital Status: Single  Alcohol History:  reports current alcohol use.  Tobacco History:  reports that she quit smoking about 9 years ago. She has never used smokeless tobacco.  Drug History:  reports no history of drug use.    Review of patient's allergies indicates:   Allergen Reactions    Lisinopril      Other reaction(s): COUGH  Cough  Cough  Cough      Penicillin      Other reaction(s): Unknown       Current Outpatient Medications:     BD ULTRA-FINE JENNIFER PEN NEEDLE 32 gauge x 5/32" Ndle, Use with insulin once daily, " Disp: 300 each, Rfl: 2    buPROPion (WELLBUTRIN XL) 300 MG 24 hr tablet, Take 1 tablet (300 mg total) by mouth once daily., Disp: 90 tablet, Rfl: 3    fluticasone propionate (FLONASE ALLERGY RELIEF) 50 mcg/actuation nasal spray, , Disp: , Rfl:     amLODIPine (NORVASC) 5 MG tablet, Take 1 tablet (5 mg total) by mouth once daily., Disp: 90 tablet, Rfl: 3    baclofen (LIORESAL) 10 MG tablet, Take 1 tablet (10 mg total) by mouth 3 (three) times daily., Disp: 270 tablet, Rfl: 3    dulaglutide (TRULICITY) 1.5 mg/0.5 mL pen injector, Inject 1.5 mg into the skin every 7 days., Disp: 6 mL, Rfl: 3    JARDIANCE 25 mg tablet, Take 1 tablet (25 mg total) by mouth once daily., Disp: 90 tablet, Rfl: 3    LANTUS SOLOSTAR U-100 INSULIN glargine 100 units/mL (3mL) SubQ pen, Inject 60 Units into the skin once daily., Disp: 54 mL, Rfl: 3    levothyroxine (SYNTHROID) 50 MCG tablet, Take 1 tablet (50 mcg total) by mouth before breakfast., Disp: 90 tablet, Rfl: 3    losartan (COZAAR) 100 MG tablet, Take 0.5 tablets (50 mg total) by mouth 2 (two) times daily., Disp: 90 tablet, Rfl: 3    Review of Systems   Constitutional: Negative for activity change, fatigue and unexpected weight change.   HENT: Negative for hearing loss, postnasal drip, sinus pressure, sore throat and voice change.    Eyes: Negative for photophobia and visual disturbance.   Respiratory: Negative for cough, shortness of breath and wheezing.    Cardiovascular: Positive for leg swelling. Negative for chest pain and palpitations.   Gastrointestinal: Negative for constipation, diarrhea and nausea.   Genitourinary: Negative for difficulty urinating, frequency, hematuria and urgency.   Musculoskeletal: Positive for arthralgias. Negative for back pain.   Skin: Negative for rash.   Neurological: Negative for weakness, light-headedness and headaches.   Hematological: Negative for adenopathy. Does not bruise/bleed easily.   Psychiatric/Behavioral: The patient is not  "nervous/anxious.           Objective:      Vitals:    08/05/20 1553   BP: (!) 114/52   Pulse: 84   Temp: 98.3 °F (36.8 °C)   Weight: 127 kg (280 lb)   Height: 5' 5.5" (1.664 m)     Physical Exam  Vitals signs reviewed.   Constitutional:       General: She is not in acute distress.     Appearance: Normal appearance. She is well-developed.   HENT:      Head: Normocephalic and atraumatic.      Right Ear: External ear normal.      Left Ear: External ear normal.      Nose: Nose normal.      Mouth/Throat:      Mouth: Mucous membranes are moist.   Eyes:      General: Lids are normal.      Conjunctiva/sclera: Conjunctivae normal.      Pupils: Pupils are equal, round, and reactive to light.   Neck:      Musculoskeletal: Full passive range of motion without pain and neck supple.      Thyroid: No thyromegaly.      Vascular: No JVD.      Trachea: No tracheal deviation.   Cardiovascular:      Rate and Rhythm: Normal rate and regular rhythm.      Chest Wall: PMI is not displaced.      Pulses: Normal pulses.      Heart sounds: Normal heart sounds.   Pulmonary:      Effort: Pulmonary effort is normal.      Breath sounds: Normal breath sounds.   Abdominal:      General: Bowel sounds are normal.      Palpations: Abdomen is soft.      Tenderness: There is no abdominal tenderness. There is no guarding or rebound.   Musculoskeletal: Normal range of motion.         General: No tenderness.      Comments: Trace edema   Skin:     General: Skin is warm and dry.      Findings: No rash.   Neurological:      General: No focal deficit present.      Mental Status: She is alert and oriented to person, place, and time.   Psychiatric:         Mood and Affect: Mood normal.         Behavior: Behavior normal.           Assessment:       1. Type 2 diabetes mellitus with other diabetic kidney complication    2. Acquired hypothyroidism    3. Essential hypertension    4. Long term current use of insulin    5. Mild peripheral edema         Plan:       Type " 2 diabetes mellitus with other diabetic kidney complication  -     Hemoglobin A1C, POCT  -     Discontinue: LANTUS SOLOSTAR U-100 INSULIN glargine 100 units/mL (3mL) SubQ pen; Inject 60 Units into the skin once daily.  Dispense: 54 mL; Refill: 1  -     Lipid Panel; Future; Expected date: 10/26/2020  -     Cancel: Hemoglobin A1C; Future; Expected date: 10/26/2020  -     Microalbumin/creatinine urine ratio; Future; Expected date: 10/26/2020    Acquired hypothyroidism  -     TSH w/reflex to FT4; Future; Expected date: 10/26/2020    Essential hypertension  Comments:  Monitored  Orders:  -     Comprehensive metabolic panel; Future; Expected date: 10/26/2020    Long term current use of insulin    Mild peripheral edema  Comments:  Watch salt intake.  Increase physical activity.      Follow up in about 3 months (around 11/5/2020) for Diabetic Check-Up.

## 2020-10-26 ENCOUNTER — TELEPHONE (OUTPATIENT)
Dept: FAMILY MEDICINE | Facility: CLINIC | Age: 50
End: 2020-10-26

## 2020-11-10 ENCOUNTER — OFFICE VISIT (OUTPATIENT)
Dept: FAMILY MEDICINE | Facility: CLINIC | Age: 50
End: 2020-11-10
Payer: COMMERCIAL

## 2020-11-10 VITALS
DIASTOLIC BLOOD PRESSURE: 76 MMHG | TEMPERATURE: 97 F | HEIGHT: 66 IN | HEART RATE: 97 BPM | BODY MASS INDEX: 44.68 KG/M2 | SYSTOLIC BLOOD PRESSURE: 110 MMHG | WEIGHT: 278 LBS

## 2020-11-10 DIAGNOSIS — Z23 FLU VACCINE NEED: ICD-10-CM

## 2020-11-10 DIAGNOSIS — F33.0 MAJOR DEPRESSIVE DISORDER, RECURRENT, MILD: ICD-10-CM

## 2020-11-10 DIAGNOSIS — E03.9 ACQUIRED HYPOTHYROIDISM: ICD-10-CM

## 2020-11-10 DIAGNOSIS — I10 ESSENTIAL HYPERTENSION: ICD-10-CM

## 2020-11-10 DIAGNOSIS — E11.29 TYPE 2 DIABETES MELLITUS WITH OTHER DIABETIC KIDNEY COMPLICATION: Primary | ICD-10-CM

## 2020-11-10 LAB — HBA1C MFR BLD: 6.9 %

## 2020-11-10 PROCEDURE — 3008F BODY MASS INDEX DOCD: CPT | Mod: S$GLB,,, | Performed by: FAMILY MEDICINE

## 2020-11-10 PROCEDURE — 99214 PR OFFICE/OUTPT VISIT, EST, LEVL IV, 30-39 MIN: ICD-10-PCS | Mod: S$GLB,,, | Performed by: FAMILY MEDICINE

## 2020-11-10 PROCEDURE — 3074F PR MOST RECENT SYSTOLIC BLOOD PRESSURE < 130 MM HG: ICD-10-PCS | Mod: S$GLB,,, | Performed by: FAMILY MEDICINE

## 2020-11-10 PROCEDURE — 3078F DIAST BP <80 MM HG: CPT | Mod: S$GLB,,, | Performed by: FAMILY MEDICINE

## 2020-11-10 PROCEDURE — 99214 OFFICE O/P EST MOD 30 MIN: CPT | Mod: S$GLB,,, | Performed by: FAMILY MEDICINE

## 2020-11-10 PROCEDURE — 3074F SYST BP LT 130 MM HG: CPT | Mod: S$GLB,,, | Performed by: FAMILY MEDICINE

## 2020-11-10 PROCEDURE — 3008F PR BODY MASS INDEX (BMI) DOCUMENTED: ICD-10-PCS | Mod: S$GLB,,, | Performed by: FAMILY MEDICINE

## 2020-11-10 PROCEDURE — 3078F PR MOST RECENT DIASTOLIC BLOOD PRESSURE < 80 MM HG: ICD-10-PCS | Mod: S$GLB,,, | Performed by: FAMILY MEDICINE

## 2020-11-10 PROCEDURE — 83036 HEMOGLOBIN GLYCOSYLATED A1C: CPT | Mod: QW,,, | Performed by: FAMILY MEDICINE

## 2020-11-10 PROCEDURE — 3044F PR MOST RECENT HEMOGLOBIN A1C LEVEL <7.0%: ICD-10-PCS | Mod: S$GLB,,, | Performed by: FAMILY MEDICINE

## 2020-11-10 PROCEDURE — 3044F HG A1C LEVEL LT 7.0%: CPT | Mod: S$GLB,,, | Performed by: FAMILY MEDICINE

## 2020-11-10 PROCEDURE — 83036 POCT HEMOGLOBIN A1C: ICD-10-PCS | Mod: QW,,, | Performed by: FAMILY MEDICINE

## 2020-11-10 RX ORDER — EMPAGLIFLOZIN 25 MG/1
25 TABLET, FILM COATED ORAL DAILY
Qty: 90 TABLET | Refills: 3 | Status: SHIPPED | OUTPATIENT
Start: 2020-11-10 | End: 2021-08-25 | Stop reason: SDUPTHER

## 2020-11-10 RX ORDER — INSULIN GLARGINE 100 [IU]/ML
60 INJECTION, SOLUTION SUBCUTANEOUS DAILY
Qty: 54 ML | Refills: 3 | Status: SHIPPED | OUTPATIENT
Start: 2020-11-10 | End: 2021-08-17 | Stop reason: SDUPTHER

## 2020-11-10 RX ORDER — LEVOTHYROXINE SODIUM 50 UG/1
50 TABLET ORAL
Qty: 90 TABLET | Refills: 3 | Status: SHIPPED | OUTPATIENT
Start: 2020-11-10 | End: 2021-08-25 | Stop reason: SDUPTHER

## 2020-11-10 RX ORDER — DULAGLUTIDE 1.5 MG/.5ML
1.5 INJECTION, SOLUTION SUBCUTANEOUS
Qty: 6 ML | Refills: 3 | Status: SHIPPED | OUTPATIENT
Start: 2020-11-10 | End: 2021-08-25

## 2020-11-10 RX ORDER — AMLODIPINE BESYLATE 5 MG/1
5 TABLET ORAL DAILY
Qty: 90 TABLET | Refills: 3 | Status: SHIPPED | OUTPATIENT
Start: 2020-11-10 | End: 2021-08-25

## 2020-11-10 RX ORDER — LOSARTAN POTASSIUM 100 MG/1
50 TABLET ORAL 2 TIMES DAILY
Qty: 90 TABLET | Refills: 3 | Status: SHIPPED | OUTPATIENT
Start: 2020-11-10 | End: 2021-08-25 | Stop reason: SDUPTHER

## 2020-11-10 RX ORDER — BACLOFEN 10 MG/1
10 TABLET ORAL 3 TIMES DAILY
Qty: 270 TABLET | Refills: 3 | Status: SHIPPED | OUTPATIENT
Start: 2020-11-10 | End: 2021-08-25 | Stop reason: SDUPTHER

## 2020-11-10 NOTE — PROGRESS NOTES
SUBJECTIVE:    Patient ID: Demetrice Palmer is a 50 y.o. female.    Chief Complaint: 3M Check Up / A1C and flu vaccine today    The patient hypertension and diabetes presents for visit.  Blood pressure and diabetes are noted to be well controlled.  She is not having any issues with her medications.  Weight is stable.  Flu vaccine given today.  Up-to-date with mammogram.  She has had some issues abnormal liver enzymes and we need to continue to monitor.  Labs are pending      Past Medical History:   Diagnosis Date    History of avascular necrosis of capital femoral epiphysis      Past Surgical History:   Procedure Laterality Date    HIP ARTHROPLASTY Right 2007    avascular necrosis    TONSILLECTOMY      UVULOPALATOPHARYNGOPLASTY AND SEPTOPLASTY       Family History   Problem Relation Age of Onset    Hypertension Father     Diabetes Father     Peripheral vascular disease Father     Prostate cancer Father     Multiple sclerosis Sister        Marital Status: Single  Alcohol History:  reports current alcohol use.  Tobacco History:  reports that she quit smoking about 9 years ago. She has never used smokeless tobacco.  Drug History:  reports no history of drug use.    Review of patient's allergies indicates:   Allergen Reactions    Lisinopril      Other reaction(s): COUGH  Cough  Cough  Cough      Penicillin      Other reaction(s): Unknown       Current Outpatient Medications:     amLODIPine (NORVASC) 5 MG tablet, Take 1 tablet (5 mg total) by mouth once daily., Disp: 90 tablet, Rfl: 3    baclofen (LIORESAL) 10 MG tablet, Take 1 tablet (10 mg total) by mouth 3 (three) times daily., Disp: 270 tablet, Rfl: 3    buPROPion (WELLBUTRIN XL) 300 MG 24 hr tablet, Take 1 tablet (300 mg total) by mouth once daily., Disp: 90 tablet, Rfl: 3    JARDIANCE 25 mg tablet, Take 1 tablet (25 mg total) by mouth once daily., Disp: 90 tablet, Rfl: 3    LANTUS SOLOSTAR U-100 INSULIN glargine 100 units/mL (3mL) SubQ pen,  "Inject 60 Units into the skin once daily., Disp: 54 mL, Rfl: 3    levothyroxine (SYNTHROID) 50 MCG tablet, Take 1 tablet (50 mcg total) by mouth before breakfast., Disp: 90 tablet, Rfl: 3    losartan (COZAAR) 100 MG tablet, Take 0.5 tablets (50 mg total) by mouth 2 (two) times daily., Disp: 90 tablet, Rfl: 3    BD ULTRA-FINE JENNIFER PEN NEEDLE 32 gauge x 5/32" Ndle, Use with insulin once daily, Disp: 300 each, Rfl: 2    dulaglutide (TRULICITY) 1.5 mg/0.5 mL pen injector, Inject 1.5 mg into the skin every 7 days., Disp: 6 mL, Rfl: 3    fluticasone propionate (FLONASE ALLERGY RELIEF) 50 mcg/actuation nasal spray, , Disp: , Rfl:     Review of Systems   Constitutional: Negative for activity change, fatigue and unexpected weight change.   HENT: Negative for hearing loss, postnasal drip, sinus pressure, sore throat and voice change.    Eyes: Negative for photophobia and visual disturbance.   Respiratory: Negative for cough, shortness of breath and wheezing.    Cardiovascular: Negative for chest pain and palpitations.   Gastrointestinal: Negative for constipation, diarrhea and nausea.   Genitourinary: Negative for difficulty urinating, frequency, hematuria and urgency.   Musculoskeletal: Negative for arthralgias and back pain.   Skin: Negative for rash.   Neurological: Negative for weakness, light-headedness and headaches.   Hematological: Negative for adenopathy. Does not bruise/bleed easily.   Psychiatric/Behavioral: The patient is not nervous/anxious.           Objective:      Vitals:    11/10/20 1631   BP: 110/76   Pulse: 97   Temp: 97.2 °F (36.2 °C)   Weight: 126.1 kg (278 lb)   Height: 5' 5.5" (1.664 m)     Physical Exam  Vitals signs reviewed.   Constitutional:       General: She is not in acute distress.     Appearance: Normal appearance. She is well-developed.   HENT:      Head: Normocephalic and atraumatic.      Right Ear: External ear normal.      Left Ear: External ear normal.      Nose: Nose normal.      " Mouth/Throat:      Mouth: Mucous membranes are moist.   Eyes:      General: Lids are normal.      Conjunctiva/sclera: Conjunctivae normal.      Pupils: Pupils are equal, round, and reactive to light.   Neck:      Musculoskeletal: Full passive range of motion without pain and neck supple.      Thyroid: No thyromegaly.      Vascular: No JVD.      Trachea: No tracheal deviation.   Cardiovascular:      Rate and Rhythm: Normal rate and regular rhythm.      Chest Wall: PMI is not displaced.      Pulses: Normal pulses.      Heart sounds: Normal heart sounds.   Pulmonary:      Effort: Pulmonary effort is normal.      Breath sounds: Normal breath sounds.   Abdominal:      General: Bowel sounds are normal.      Palpations: Abdomen is soft.      Tenderness: There is no abdominal tenderness. There is no guarding or rebound.   Musculoskeletal: Normal range of motion.         General: No tenderness.   Skin:     General: Skin is warm and dry.      Findings: No rash.   Neurological:      General: No focal deficit present.      Mental Status: She is alert and oriented to person, place, and time.   Psychiatric:         Mood and Affect: Mood normal.         Behavior: Behavior normal.           Assessment:       1. Type 2 diabetes mellitus with other diabetic kidney complication    2. Essential hypertension    3. Acquired hypothyroidism    4. Major depressive disorder, recurrent, mild    5. Flu vaccine need         Plan:       Type 2 diabetes mellitus with other diabetic kidney complication  -     Hemoglobin A1C, POCT  -     JARDIANCE 25 mg tablet; Take 1 tablet (25 mg total) by mouth once daily.  Dispense: 90 tablet; Refill: 3  -     LANTUS SOLOSTAR U-100 INSULIN glargine 100 units/mL (3mL) SubQ pen; Inject 60 Units into the skin once daily.  Dispense: 54 mL; Refill: 3  -     dulaglutide (TRULICITY) 1.5 mg/0.5 mL pen injector; Inject 1.5 mg into the skin every 7 days.  Dispense: 6 mL; Refill: 3    Essential hypertension  -      amLODIPine (NORVASC) 5 MG tablet; Take 1 tablet (5 mg total) by mouth once daily.  Dispense: 90 tablet; Refill: 3  -     losartan (COZAAR) 100 MG tablet; Take 0.5 tablets (50 mg total) by mouth 2 (two) times daily.  Dispense: 90 tablet; Refill: 3    Acquired hypothyroidism  -     levothyroxine (SYNTHROID) 50 MCG tablet; Take 1 tablet (50 mcg total) by mouth before breakfast.  Dispense: 90 tablet; Refill: 3    Major depressive disorder, recurrent, mild  -     baclofen (LIORESAL) 10 MG tablet; Take 1 tablet (10 mg total) by mouth 3 (three) times daily.  Dispense: 270 tablet; Refill: 3    Flu vaccine need  -     Influenza - Quadrivalent (Recombinant) (PF)      Follow up in about 6 months (around 5/10/2021) for Diabetic Check-Up, HTN.

## 2020-11-11 PROCEDURE — 90682 RIV4 VACC RECOMBINANT DNA IM: CPT | Mod: S$GLB,,, | Performed by: FAMILY MEDICINE

## 2020-11-11 PROCEDURE — 90471 IMMUNIZATION ADMIN: CPT | Mod: S$GLB,,, | Performed by: FAMILY MEDICINE

## 2020-11-11 PROCEDURE — 90682 FLU VACCINE - QUADRIVALENT (RECOMBINANT) PRESERVATIVE FREE: ICD-10-PCS | Mod: S$GLB,,, | Performed by: FAMILY MEDICINE

## 2020-11-11 PROCEDURE — 90471 FLU VACCINE - QUADRIVALENT (RECOMBINANT) PRESERVATIVE FREE: ICD-10-PCS | Mod: S$GLB,,, | Performed by: FAMILY MEDICINE

## 2021-05-13 ENCOUNTER — TELEPHONE (OUTPATIENT)
Dept: FAMILY MEDICINE | Facility: CLINIC | Age: 51
End: 2021-05-13

## 2021-07-14 DIAGNOSIS — F33.0 MAJOR DEPRESSIVE DISORDER, RECURRENT, MILD: ICD-10-CM

## 2021-07-14 RX ORDER — BUPROPION HYDROCHLORIDE 300 MG/1
300 TABLET ORAL DAILY
Qty: 90 TABLET | Refills: 3 | Status: SHIPPED | OUTPATIENT
Start: 2021-07-14 | End: 2021-08-25 | Stop reason: SDUPTHER

## 2021-08-13 ENCOUNTER — TELEPHONE (OUTPATIENT)
Dept: FAMILY MEDICINE | Facility: CLINIC | Age: 51
End: 2021-08-13

## 2021-08-13 DIAGNOSIS — I10 ESSENTIAL HYPERTENSION: ICD-10-CM

## 2021-08-13 DIAGNOSIS — E11.29 TYPE 2 DIABETES MELLITUS WITH OTHER DIABETIC KIDNEY COMPLICATION: Primary | ICD-10-CM

## 2021-08-13 DIAGNOSIS — E03.9 ACQUIRED HYPOTHYROIDISM: ICD-10-CM

## 2021-08-14 LAB
ALBUMIN SERPL-MCNC: 4.6 G/DL (ref 3.8–4.8)
ALBUMIN/CREAT UR: 46 MG/G CREAT (ref 0–29)
ALBUMIN/GLOB SERPL: 1.8 {RATIO} (ref 1.2–2.2)
ALP SERPL-CCNC: 67 IU/L (ref 48–121)
ALT SERPL-CCNC: 29 IU/L (ref 0–32)
AST SERPL-CCNC: 25 IU/L (ref 0–40)
BILIRUB SERPL-MCNC: 0.4 MG/DL (ref 0–1.2)
BUN SERPL-MCNC: 12 MG/DL (ref 6–24)
BUN/CREAT SERPL: 15 (ref 9–23)
CALCIUM SERPL-MCNC: 9.6 MG/DL (ref 8.7–10.2)
CHLORIDE SERPL-SCNC: 103 MMOL/L (ref 96–106)
CHOLEST SERPL-MCNC: 161 MG/DL (ref 100–199)
CO2 SERPL-SCNC: 22 MMOL/L (ref 20–29)
CREAT SERPL-MCNC: 0.79 MG/DL (ref 0.57–1)
CREAT UR-MCNC: 84.6 MG/DL
GLOBULIN SER CALC-MCNC: 2.6 G/DL (ref 1.5–4.5)
GLUCOSE SERPL-MCNC: 85 MG/DL (ref 65–99)
HBA1C MFR BLD: 5.9 % (ref 4.8–5.6)
HDLC SERPL-MCNC: 62 MG/DL
LDLC SERPL CALC-MCNC: 83 MG/DL (ref 0–99)
MICROALBUMIN UR-MCNC: 38.8 UG/ML
POTASSIUM SERPL-SCNC: 4.6 MMOL/L (ref 3.5–5.2)
PROT SERPL-MCNC: 7.2 G/DL (ref 6–8.5)
SODIUM SERPL-SCNC: 141 MMOL/L (ref 134–144)
TRIGL SERPL-MCNC: 84 MG/DL (ref 0–149)
TSH SERPL DL<=0.005 MIU/L-ACNC: 1.45 UIU/ML (ref 0.45–4.5)
VLDLC SERPL CALC-MCNC: 16 MG/DL (ref 5–40)

## 2021-08-17 ENCOUNTER — PATIENT MESSAGE (OUTPATIENT)
Dept: FAMILY MEDICINE | Facility: CLINIC | Age: 51
End: 2021-08-17

## 2021-08-17 DIAGNOSIS — E11.29 TYPE 2 DIABETES MELLITUS WITH OTHER DIABETIC KIDNEY COMPLICATION: ICD-10-CM

## 2021-08-17 RX ORDER — INSULIN GLARGINE 100 [IU]/ML
60 INJECTION, SOLUTION SUBCUTANEOUS DAILY
Qty: 24 ML | Refills: 0 | Status: SHIPPED | OUTPATIENT
Start: 2021-08-17 | End: 2021-08-25

## 2021-08-25 ENCOUNTER — OFFICE VISIT (OUTPATIENT)
Dept: FAMILY MEDICINE | Facility: CLINIC | Age: 51
End: 2021-08-25
Payer: COMMERCIAL

## 2021-08-25 VITALS
HEART RATE: 82 BPM | SYSTOLIC BLOOD PRESSURE: 110 MMHG | HEIGHT: 66 IN | DIASTOLIC BLOOD PRESSURE: 62 MMHG | BODY MASS INDEX: 42.27 KG/M2 | WEIGHT: 263 LBS

## 2021-08-25 DIAGNOSIS — Z12.31 ENCOUNTER FOR SCREENING MAMMOGRAM FOR MALIGNANT NEOPLASM OF BREAST: ICD-10-CM

## 2021-08-25 DIAGNOSIS — F33.0 MAJOR DEPRESSIVE DISORDER, RECURRENT, MILD: ICD-10-CM

## 2021-08-25 DIAGNOSIS — L65.9 ALOPECIA OF SCALP: ICD-10-CM

## 2021-08-25 DIAGNOSIS — E03.9 ACQUIRED HYPOTHYROIDISM: ICD-10-CM

## 2021-08-25 DIAGNOSIS — I10 ESSENTIAL HYPERTENSION: ICD-10-CM

## 2021-08-25 DIAGNOSIS — L68.0 FEMALE HIRSUTISM: ICD-10-CM

## 2021-08-25 DIAGNOSIS — E66.01 MORBID (SEVERE) OBESITY DUE TO EXCESS CALORIES: ICD-10-CM

## 2021-08-25 DIAGNOSIS — Z00.01 ENCOUNTER FOR GENERAL ADULT MEDICAL EXAMINATION WITH ABNORMAL FINDINGS: Primary | ICD-10-CM

## 2021-08-25 DIAGNOSIS — E11.29 TYPE 2 DIABETES MELLITUS WITH OTHER DIABETIC KIDNEY COMPLICATION: ICD-10-CM

## 2021-08-25 DIAGNOSIS — Z12.11 COLON CANCER SCREENING: ICD-10-CM

## 2021-08-25 DIAGNOSIS — Z79.4 LONG TERM CURRENT USE OF INSULIN: ICD-10-CM

## 2021-08-25 PROCEDURE — 3066F PR DOCUMENTATION OF TREATMENT FOR NEPHROPATHY: ICD-10-PCS | Mod: S$GLB,,, | Performed by: FAMILY MEDICINE

## 2021-08-25 PROCEDURE — 3060F POS MICROALBUMINURIA REV: CPT | Mod: S$GLB,,, | Performed by: FAMILY MEDICINE

## 2021-08-25 PROCEDURE — 3078F DIAST BP <80 MM HG: CPT | Mod: S$GLB,,, | Performed by: FAMILY MEDICINE

## 2021-08-25 PROCEDURE — 3008F BODY MASS INDEX DOCD: CPT | Mod: S$GLB,,, | Performed by: FAMILY MEDICINE

## 2021-08-25 PROCEDURE — 3060F PR POS MICROALBUMINURIA RESULT DOCUMENTED/REVIEW: ICD-10-PCS | Mod: S$GLB,,, | Performed by: FAMILY MEDICINE

## 2021-08-25 PROCEDURE — 4010F ACE/ARB THERAPY RXD/TAKEN: CPT | Mod: S$GLB,,, | Performed by: FAMILY MEDICINE

## 2021-08-25 PROCEDURE — 99396 PR PREVENTIVE VISIT,EST,40-64: ICD-10-PCS | Mod: S$GLB,,, | Performed by: FAMILY MEDICINE

## 2021-08-25 PROCEDURE — 3074F PR MOST RECENT SYSTOLIC BLOOD PRESSURE < 130 MM HG: ICD-10-PCS | Mod: S$GLB,,, | Performed by: FAMILY MEDICINE

## 2021-08-25 PROCEDURE — 4010F PR ACE/ARB THEARPY RXD/TAKEN: ICD-10-PCS | Mod: S$GLB,,, | Performed by: FAMILY MEDICINE

## 2021-08-25 PROCEDURE — 3066F NEPHROPATHY DOC TX: CPT | Mod: S$GLB,,, | Performed by: FAMILY MEDICINE

## 2021-08-25 PROCEDURE — 99396 PREV VISIT EST AGE 40-64: CPT | Mod: S$GLB,,, | Performed by: FAMILY MEDICINE

## 2021-08-25 PROCEDURE — 3074F SYST BP LT 130 MM HG: CPT | Mod: S$GLB,,, | Performed by: FAMILY MEDICINE

## 2021-08-25 PROCEDURE — 3078F PR MOST RECENT DIASTOLIC BLOOD PRESSURE < 80 MM HG: ICD-10-PCS | Mod: S$GLB,,, | Performed by: FAMILY MEDICINE

## 2021-08-25 PROCEDURE — 3008F PR BODY MASS INDEX (BMI) DOCUMENTED: ICD-10-PCS | Mod: S$GLB,,, | Performed by: FAMILY MEDICINE

## 2021-08-25 RX ORDER — SPIRONOLACTONE 100 MG/1
100 TABLET, FILM COATED ORAL DAILY
Qty: 90 TABLET | Refills: 3 | Status: SHIPPED | OUTPATIENT
Start: 2021-08-25 | End: 2023-03-07 | Stop reason: SDUPTHER

## 2021-08-25 RX ORDER — BACLOFEN 10 MG/1
10 TABLET ORAL 3 TIMES DAILY
Qty: 270 TABLET | Refills: 3 | Status: ON HOLD | OUTPATIENT
Start: 2021-08-25 | End: 2022-08-30 | Stop reason: SDUPTHER

## 2021-08-25 RX ORDER — INSULIN GLARGINE 100 [IU]/ML
50 INJECTION, SOLUTION SUBCUTANEOUS DAILY
Qty: 45 ML | Refills: 3 | Status: SHIPPED | OUTPATIENT
Start: 2021-08-25 | End: 2021-09-03 | Stop reason: SDUPTHER

## 2021-08-25 RX ORDER — LOSARTAN POTASSIUM 100 MG/1
50 TABLET ORAL 2 TIMES DAILY
Qty: 90 TABLET | Refills: 3 | Status: ON HOLD | OUTPATIENT
Start: 2021-08-25 | End: 2022-08-30 | Stop reason: SDUPTHER

## 2021-08-25 RX ORDER — MINOXIDIL 50 MG/G
AEROSOL, FOAM TOPICAL DAILY
COMMUNITY
End: 2022-08-28 | Stop reason: CLARIF

## 2021-08-25 RX ORDER — EMPAGLIFLOZIN 25 MG/1
25 TABLET, FILM COATED ORAL DAILY
Qty: 90 TABLET | Refills: 3 | Status: ON HOLD | OUTPATIENT
Start: 2021-08-25 | End: 2022-08-30 | Stop reason: SDUPTHER

## 2021-08-25 RX ORDER — AMLODIPINE BESYLATE 2.5 MG/1
2.5 TABLET ORAL DAILY
Qty: 90 TABLET | Refills: 3 | Status: SHIPPED | OUTPATIENT
Start: 2021-08-25 | End: 2023-02-01 | Stop reason: SDUPTHER

## 2021-08-25 RX ORDER — BUPROPION HYDROCHLORIDE 300 MG/1
300 TABLET ORAL DAILY
Qty: 90 TABLET | Refills: 3 | Status: SHIPPED | OUTPATIENT
Start: 2021-08-25 | End: 2023-02-01 | Stop reason: SDUPTHER

## 2021-08-25 RX ORDER — LEVOTHYROXINE SODIUM 50 UG/1
50 TABLET ORAL
Qty: 90 TABLET | Refills: 3 | Status: SHIPPED | OUTPATIENT
Start: 2021-08-25 | End: 2023-02-01 | Stop reason: SDUPTHER

## 2021-08-25 RX ORDER — DULAGLUTIDE 3 MG/.5ML
3 INJECTION, SOLUTION SUBCUTANEOUS
Qty: 13 PEN | Refills: 3 | Status: SHIPPED | OUTPATIENT
Start: 2021-08-25 | End: 2021-09-03 | Stop reason: SDUPTHER

## 2021-09-03 DIAGNOSIS — E11.29 TYPE 2 DIABETES MELLITUS WITH OTHER DIABETIC KIDNEY COMPLICATION: ICD-10-CM

## 2021-09-03 RX ORDER — PEN NEEDLE, DIABETIC 31 GX5/16"
NEEDLE, DISPOSABLE MISCELLANEOUS
Qty: 100 EACH | Refills: 0 | Status: SHIPPED | OUTPATIENT
Start: 2021-09-03 | End: 2021-09-14 | Stop reason: SDUPTHER

## 2021-09-03 RX ORDER — INSULIN GLARGINE 100 [IU]/ML
50 INJECTION, SOLUTION SUBCUTANEOUS DAILY
Qty: 15 ML | Refills: 0 | Status: SHIPPED | OUTPATIENT
Start: 2021-09-03 | End: 2021-09-14 | Stop reason: SDUPTHER

## 2021-09-03 RX ORDER — DULAGLUTIDE 3 MG/.5ML
3 INJECTION, SOLUTION SUBCUTANEOUS
Qty: 4 PEN | Refills: 0 | Status: SHIPPED | OUTPATIENT
Start: 2021-09-03 | End: 2021-09-14 | Stop reason: SDUPTHER

## 2021-09-13 ENCOUNTER — PATIENT MESSAGE (OUTPATIENT)
Dept: FAMILY MEDICINE | Facility: CLINIC | Age: 51
End: 2021-09-13

## 2021-09-14 DIAGNOSIS — E11.29 TYPE 2 DIABETES MELLITUS WITH OTHER DIABETIC KIDNEY COMPLICATION: ICD-10-CM

## 2021-09-14 RX ORDER — PEN NEEDLE, DIABETIC 31 GX5/16"
NEEDLE, DISPOSABLE MISCELLANEOUS
Qty: 100 EACH | Refills: 0 | Status: SHIPPED | OUTPATIENT
Start: 2021-09-14 | End: 2022-01-10

## 2021-09-14 RX ORDER — DULAGLUTIDE 3 MG/.5ML
3 INJECTION, SOLUTION SUBCUTANEOUS
Qty: 13 PEN | Refills: 3 | Status: ON HOLD | OUTPATIENT
Start: 2021-09-14 | End: 2022-08-30 | Stop reason: SDUPTHER

## 2021-09-14 RX ORDER — INSULIN GLARGINE 100 [IU]/ML
50 INJECTION, SOLUTION SUBCUTANEOUS DAILY
Qty: 45 ML | Refills: 3 | Status: SHIPPED | OUTPATIENT
Start: 2021-09-14 | End: 2022-02-03 | Stop reason: CLARIF

## 2022-01-09 DIAGNOSIS — E11.29 TYPE 2 DIABETES MELLITUS WITH OTHER DIABETIC KIDNEY COMPLICATION: ICD-10-CM

## 2022-01-10 RX ORDER — PEN NEEDLE, DIABETIC 30 GX3/16"
1 NEEDLE, DISPOSABLE MISCELLANEOUS DAILY
Qty: 100 EACH | Refills: 3 | Status: SHIPPED | OUTPATIENT
Start: 2022-01-10 | End: 2022-09-15 | Stop reason: SDUPTHER

## 2022-02-03 ENCOUNTER — TELEPHONE (OUTPATIENT)
Dept: FAMILY MEDICINE | Facility: CLINIC | Age: 52
End: 2022-02-03
Payer: COMMERCIAL

## 2022-02-03 DIAGNOSIS — E11.29 TYPE 2 DIABETES MELLITUS WITH OTHER DIABETIC KIDNEY COMPLICATION: Primary | ICD-10-CM

## 2022-02-03 RX ORDER — INSULIN GLARGINE 300 U/ML
50 INJECTION, SOLUTION SUBCUTANEOUS DAILY
Qty: 5 PEN | Refills: 3 | Status: ON HOLD | OUTPATIENT
Start: 2022-02-03 | End: 2022-08-30 | Stop reason: SDUPTHER

## 2022-02-03 NOTE — TELEPHONE ENCOUNTER
----- Message from Maddie Stearns sent at 2/3/2022  3:43 PM CST -----  [facility] calling said the Lantus is not a covered medication on the pt's plan asking if it is ok to change to Semglee.

## 2022-02-07 ENCOUNTER — PATIENT MESSAGE (OUTPATIENT)
Dept: FAMILY MEDICINE | Facility: CLINIC | Age: 52
End: 2022-02-07
Payer: COMMERCIAL

## 2022-02-08 ENCOUNTER — PATIENT MESSAGE (OUTPATIENT)
Dept: FAMILY MEDICINE | Facility: CLINIC | Age: 52
End: 2022-02-08
Payer: COMMERCIAL

## 2022-02-17 ENCOUNTER — TELEPHONE (OUTPATIENT)
Dept: FAMILY MEDICINE | Facility: CLINIC | Age: 52
End: 2022-02-17
Payer: COMMERCIAL

## 2022-02-17 DIAGNOSIS — E11.29 TYPE 2 DIABETES MELLITUS WITH OTHER DIABETIC KIDNEY COMPLICATION: ICD-10-CM

## 2022-02-17 NOTE — TELEPHONE ENCOUNTER
----- Message from Adilene Torres sent at 2/17/2022 11:25 AM CST -----  Patient needs a refill on Plenity sent to [facility] on [address] in [place]. Patient also wants to know if she needs labwork before her next appointment. I rescheduled her appointment from 02/28/2022 to 05/03/2022. Patient's callback number is [phone].

## 2022-03-25 ENCOUNTER — TELEPHONE (OUTPATIENT)
Dept: FAMILY MEDICINE | Facility: CLINIC | Age: 52
End: 2022-03-25
Payer: COMMERCIAL

## 2022-08-01 ENCOUNTER — PATIENT MESSAGE (OUTPATIENT)
Dept: FAMILY MEDICINE | Facility: CLINIC | Age: 52
End: 2022-08-01

## 2022-08-01 DIAGNOSIS — E11.29 TYPE 2 DIABETES MELLITUS WITH OTHER DIABETIC KIDNEY COMPLICATION: Primary | ICD-10-CM

## 2022-08-01 DIAGNOSIS — E03.9 ACQUIRED HYPOTHYROIDISM: ICD-10-CM

## 2022-08-01 DIAGNOSIS — I10 ESSENTIAL HYPERTENSION: ICD-10-CM

## 2022-08-28 PROBLEM — E11.9 TYPE 2 DIABETES MELLITUS WITHOUT COMPLICATION, WITH LONG-TERM CURRENT USE OF INSULIN: Status: ACTIVE | Noted: 2022-08-28

## 2022-08-28 PROBLEM — Z79.4 TYPE 2 DIABETES MELLITUS WITHOUT COMPLICATION, WITH LONG-TERM CURRENT USE OF INSULIN: Status: ACTIVE | Noted: 2022-08-28

## 2022-08-28 PROBLEM — F32.A DEPRESSION: Status: ACTIVE | Noted: 2022-08-28

## 2022-08-28 PROBLEM — L03.012 CELLULITIS OF FINGER OF LEFT HAND: Status: ACTIVE | Noted: 2022-08-28

## 2022-08-28 PROBLEM — R22.32 MASS OF LEFT HAND: Status: ACTIVE | Noted: 2022-08-28

## 2022-08-29 ENCOUNTER — PATIENT MESSAGE (OUTPATIENT)
Dept: FAMILY MEDICINE | Facility: CLINIC | Age: 52
End: 2022-08-29

## 2022-08-30 ENCOUNTER — TELEPHONE (OUTPATIENT)
Dept: FAMILY MEDICINE | Facility: CLINIC | Age: 52
End: 2022-08-30

## 2022-08-30 NOTE — TELEPHONE ENCOUNTER
Scheduled HFU for the soonest available which is 9/15/22 at 12pm.    Will call patient tomorrow once home for patient outreach call

## 2022-08-30 NOTE — TELEPHONE ENCOUNTER
----- Message from Lindy Jett MA sent at 8/30/2022  9:28 AM CDT -----    ----- Message -----  From: Sushma Anderson  Sent: 8/30/2022   9:16 AM CDT  To: [facility] Staff    Summer  with  [facility] is calling to schedule a hfu. Patient is being discharged today with a 1 week f/u. Please contact patient

## 2022-09-02 ENCOUNTER — NURSE TRIAGE (OUTPATIENT)
Dept: ADMINISTRATIVE | Facility: CLINIC | Age: 52
End: 2022-09-02
Payer: COMMERCIAL

## 2022-09-02 ENCOUNTER — PATIENT MESSAGE (OUTPATIENT)
Dept: FAMILY MEDICINE | Facility: CLINIC | Age: 52
End: 2022-09-02

## 2022-09-02 DIAGNOSIS — E11.29 TYPE 2 DIABETES MELLITUS WITH OTHER DIABETIC KIDNEY COMPLICATION: ICD-10-CM

## 2022-09-02 RX ORDER — INSULIN GLARGINE 300 U/ML
60 INJECTION, SOLUTION SUBCUTANEOUS DAILY
Qty: 6 PEN | Refills: 3 | Status: CANCELLED
Start: 2022-09-02 | End: 2023-09-02

## 2022-09-02 RX ORDER — INSULIN GLARGINE 300 U/ML
60 INJECTION, SOLUTION SUBCUTANEOUS DAILY
Qty: 6 PEN | Refills: 3 | Status: SHIPPED | OUTPATIENT
Start: 2022-09-02 | End: 2022-12-12 | Stop reason: SDUPTHER

## 2022-09-02 NOTE — TELEPHONE ENCOUNTER
Pt calling to report she is almost out of insulin as the mail order pharmacy does not have any currently. I have switched prescription to [facility] in [place], per request. Confirmed arrival w/ pharmacy and pt verbalizes understanding.       Reason for Disposition   Caller has medicine question only, adult not sick, and triager answers question    Protocols used: Medication Question Call-A-OH

## 2022-09-14 LAB
ALBUMIN SERPL-MCNC: 4.5 G/DL (ref 3.8–4.9)
ALBUMIN/CREAT UR: 11 MG/G CREAT (ref 0–29)
ALBUMIN/GLOB SERPL: 1.6 {RATIO} (ref 1.2–2.2)
ALP SERPL-CCNC: 64 IU/L (ref 44–121)
ALT SERPL-CCNC: 33 IU/L (ref 0–32)
AST SERPL-CCNC: 23 IU/L (ref 0–40)
BILIRUB SERPL-MCNC: 0.5 MG/DL (ref 0–1.2)
BUN SERPL-MCNC: 12 MG/DL (ref 6–24)
BUN/CREAT SERPL: 13 (ref 9–23)
CALCIUM SERPL-MCNC: 9.7 MG/DL (ref 8.7–10.2)
CHLORIDE SERPL-SCNC: 99 MMOL/L (ref 96–106)
CHOLEST SERPL-MCNC: 171 MG/DL (ref 100–199)
CO2 SERPL-SCNC: 23 MMOL/L (ref 20–29)
CREAT SERPL-MCNC: 0.92 MG/DL (ref 0.57–1)
CREAT UR-MCNC: 204.7 MG/DL
EST. GFR  (NO RACE VARIABLE): 75 ML/MIN/1.73
GLOBULIN SER CALC-MCNC: 2.9 G/DL (ref 1.5–4.5)
GLUCOSE SERPL-MCNC: 77 MG/DL (ref 65–99)
HBA1C MFR BLD: 6.5 % (ref 4.8–5.6)
HDLC SERPL-MCNC: 63 MG/DL
LDLC SERPL CALC-MCNC: 94 MG/DL (ref 0–99)
MICROALBUMIN UR-MCNC: 21.9 UG/ML
POTASSIUM SERPL-SCNC: 4.8 MMOL/L (ref 3.5–5.2)
PROT SERPL-MCNC: 7.4 G/DL (ref 6–8.5)
SODIUM SERPL-SCNC: 140 MMOL/L (ref 134–144)
TRIGL SERPL-MCNC: 76 MG/DL (ref 0–149)
TSH SERPL DL<=0.005 MIU/L-ACNC: 2.63 UIU/ML (ref 0.45–4.5)
VLDLC SERPL CALC-MCNC: 14 MG/DL (ref 5–40)

## 2022-09-15 ENCOUNTER — OFFICE VISIT (OUTPATIENT)
Dept: FAMILY MEDICINE | Facility: CLINIC | Age: 52
End: 2022-09-15
Payer: COMMERCIAL

## 2022-09-15 VITALS
HEIGHT: 65 IN | BODY MASS INDEX: 41.65 KG/M2 | HEART RATE: 78 BPM | DIASTOLIC BLOOD PRESSURE: 74 MMHG | WEIGHT: 250 LBS | SYSTOLIC BLOOD PRESSURE: 108 MMHG

## 2022-09-15 DIAGNOSIS — L20.84 INTRINSIC ECZEMA: ICD-10-CM

## 2022-09-15 DIAGNOSIS — Z12.11 COLON CANCER SCREENING: ICD-10-CM

## 2022-09-15 DIAGNOSIS — E11.29 TYPE 2 DIABETES MELLITUS WITH OTHER DIABETIC KIDNEY COMPLICATION: Primary | ICD-10-CM

## 2022-09-15 DIAGNOSIS — L02.519 CELLULITIS AND ABSCESS OF HAND: ICD-10-CM

## 2022-09-15 DIAGNOSIS — N95.1 MENOPAUSAL SYNDROME (HOT FLASHES): ICD-10-CM

## 2022-09-15 DIAGNOSIS — E03.9 ACQUIRED HYPOTHYROIDISM: ICD-10-CM

## 2022-09-15 DIAGNOSIS — I10 ESSENTIAL HYPERTENSION: ICD-10-CM

## 2022-09-15 DIAGNOSIS — E66.01 MORBID (SEVERE) OBESITY DUE TO EXCESS CALORIES: ICD-10-CM

## 2022-09-15 DIAGNOSIS — L03.119 CELLULITIS AND ABSCESS OF HAND: ICD-10-CM

## 2022-09-15 PROCEDURE — 3061F PR NEG MICROALBUMINURIA RESULT DOCUMENTED/REVIEW: ICD-10-PCS | Mod: CPTII,S$GLB,, | Performed by: FAMILY MEDICINE

## 2022-09-15 PROCEDURE — 4010F ACE/ARB THERAPY RXD/TAKEN: CPT | Mod: CPTII,S$GLB,, | Performed by: FAMILY MEDICINE

## 2022-09-15 PROCEDURE — 3008F PR BODY MASS INDEX (BMI) DOCUMENTED: ICD-10-PCS | Mod: CPTII,S$GLB,, | Performed by: FAMILY MEDICINE

## 2022-09-15 PROCEDURE — 3074F SYST BP LT 130 MM HG: CPT | Mod: CPTII,S$GLB,, | Performed by: FAMILY MEDICINE

## 2022-09-15 PROCEDURE — 3078F PR MOST RECENT DIASTOLIC BLOOD PRESSURE < 80 MM HG: ICD-10-PCS | Mod: CPTII,S$GLB,, | Performed by: FAMILY MEDICINE

## 2022-09-15 PROCEDURE — 3066F NEPHROPATHY DOC TX: CPT | Mod: CPTII,S$GLB,, | Performed by: FAMILY MEDICINE

## 2022-09-15 PROCEDURE — 1159F PR MEDICATION LIST DOCUMENTED IN MEDICAL RECORD: ICD-10-PCS | Mod: CPTII,S$GLB,, | Performed by: FAMILY MEDICINE

## 2022-09-15 PROCEDURE — 3061F NEG MICROALBUMINURIA REV: CPT | Mod: CPTII,S$GLB,, | Performed by: FAMILY MEDICINE

## 2022-09-15 PROCEDURE — 3074F PR MOST RECENT SYSTOLIC BLOOD PRESSURE < 130 MM HG: ICD-10-PCS | Mod: CPTII,S$GLB,, | Performed by: FAMILY MEDICINE

## 2022-09-15 PROCEDURE — 3044F PR MOST RECENT HEMOGLOBIN A1C LEVEL <7.0%: ICD-10-PCS | Mod: CPTII,S$GLB,, | Performed by: FAMILY MEDICINE

## 2022-09-15 PROCEDURE — 99214 OFFICE O/P EST MOD 30 MIN: CPT | Mod: S$GLB,,, | Performed by: FAMILY MEDICINE

## 2022-09-15 PROCEDURE — 4010F PR ACE/ARB THEARPY RXD/TAKEN: ICD-10-PCS | Mod: CPTII,S$GLB,, | Performed by: FAMILY MEDICINE

## 2022-09-15 PROCEDURE — 1159F MED LIST DOCD IN RCRD: CPT | Mod: CPTII,S$GLB,, | Performed by: FAMILY MEDICINE

## 2022-09-15 PROCEDURE — 3008F BODY MASS INDEX DOCD: CPT | Mod: CPTII,S$GLB,, | Performed by: FAMILY MEDICINE

## 2022-09-15 PROCEDURE — 3044F HG A1C LEVEL LT 7.0%: CPT | Mod: CPTII,S$GLB,, | Performed by: FAMILY MEDICINE

## 2022-09-15 PROCEDURE — 3078F DIAST BP <80 MM HG: CPT | Mod: CPTII,S$GLB,, | Performed by: FAMILY MEDICINE

## 2022-09-15 PROCEDURE — 99214 PR OFFICE/OUTPT VISIT, EST, LEVL IV, 30-39 MIN: ICD-10-PCS | Mod: S$GLB,,, | Performed by: FAMILY MEDICINE

## 2022-09-15 PROCEDURE — 3066F PR DOCUMENTATION OF TREATMENT FOR NEPHROPATHY: ICD-10-PCS | Mod: CPTII,S$GLB,, | Performed by: FAMILY MEDICINE

## 2022-09-15 RX ORDER — CARBOXYMETHYLCELLULOSE/CITRIC 0.75 G
1 CAPSULE ORAL 2 TIMES DAILY WITH MEALS
Start: 2022-09-15 | End: 2023-03-07

## 2022-09-15 RX ORDER — PEN NEEDLE, DIABETIC 30 GX3/16"
1 NEEDLE, DISPOSABLE MISCELLANEOUS DAILY
Qty: 100 EACH | Refills: 3 | Status: SHIPPED | OUTPATIENT
Start: 2022-09-15 | End: 2023-09-25 | Stop reason: SDUPTHER

## 2022-09-15 RX ORDER — TRIAMCINOLONE ACETONIDE 1 MG/G
OINTMENT TOPICAL 2 TIMES DAILY
Qty: 453.6 G | Refills: 1 | Status: SHIPPED | OUTPATIENT
Start: 2022-09-15 | End: 2024-02-12

## 2022-09-15 NOTE — PROGRESS NOTES
SUBJECTIVE:    Patient ID: Demetrice Palmer is a 52 y.o. female.    Chief Complaint: Hospital Follow Up (Did not bring bottles//needs colonoscopy ( [place])// mammogram @ [place]//needs foot exam//bs)    Patient recently admitted for cellulitis of her left hand.  She was in patient with IV antibiotics and status post I&D.  She has followed up with surgery.  Does still note some tenderness in area.  Has completed her antibiotics.  Labs are acceptable.  A1c is in appropriate range although slightly elevated from previous.  Blood pressure is well controlled and thyroid is stable.  Renal and liver function normal.  Patient has noted itchy rash to bilateral posterior ankles.  There is some scaling.  She complains about some occasional numbness in the lateral portion of her left foot.  No swelling.  No pain.  No change in coloration.  She would like some assistance and helping with weight loss.  Requests plenty.  Has been haivng some leg cramping           Office Visit on 09/15/2022   Component Date Value Ref Range Status    Cologuard Result 10/03/2022 Positive (A)  Negative Final   Admission on 08/28/2022, Discharged on 08/30/2022   Component Date Value Ref Range Status    SARS-CoV-2 RNA, Amplification, Qual 08/28/2022 Negative  Negative Final    POCT Glucose 08/29/2022 146 (H)  70 - 110 mg/dL Final    Procalcitonin 08/28/2022 0.06  <0.25 ng/mL Final    WBC 08/29/2022 7.96  3.90 - 12.70 K/uL Final    RBC 08/29/2022 4.94  4.00 - 5.40 M/uL Final    Hemoglobin 08/29/2022 13.3  12.0 - 16.0 g/dL Final    Hematocrit 08/29/2022 42.2  37.0 - 48.5 % Final    MCV 08/29/2022 85  82 - 98 fL Final    MCH 08/29/2022 26.9 (L)  27.0 - 31.0 pg Final    MCHC 08/29/2022 31.5 (L)  32.0 - 36.0 g/dL Final    RDW 08/29/2022 14.5  11.5 - 14.5 % Final    Platelets 08/29/2022 305  150 - 450 K/uL Final    MPV 08/29/2022 9.4  9.2 - 12.9 fL Final    Immature Granulocytes 08/29/2022 0.1  0.0 - 0.5 % Final    Gran # (ANC) 08/29/2022 4.1  1.8 -  7.7 K/uL Final    Immature Grans (Abs) 08/29/2022 0.01  0.00 - 0.04 K/uL Final    Lymph # 08/29/2022 2.9  1.0 - 4.8 K/uL Final    Mono # 08/29/2022 0.7  0.3 - 1.0 K/uL Final    Eos # 08/29/2022 0.3  0.0 - 0.5 K/uL Final    Baso # 08/29/2022 0.04  0.00 - 0.20 K/uL Final    nRBC 08/29/2022 0  0 /100 WBC Final    Gran % 08/29/2022 51.3  38.0 - 73.0 % Final    Lymph % 08/29/2022 35.8  18.0 - 48.0 % Final    Mono % 08/29/2022 9.2  4.0 - 15.0 % Final    Eosinophil % 08/29/2022 3.1  0.0 - 8.0 % Final    Basophil % 08/29/2022 0.5  0.0 - 1.9 % Final    Differential Method 08/29/2022 Automated   Final    Sodium 08/29/2022 141  136 - 145 mmol/L Final    Potassium 08/29/2022 4.4  3.5 - 5.1 mmol/L Final    Chloride 08/29/2022 108  95 - 110 mmol/L Final    CO2 08/29/2022 23  22 - 31 mmol/L Final    Glucose 08/29/2022 115 (H)  70 - 110 mg/dL Final    BUN 08/29/2022 17  7 - 18 mg/dL Final    Creatinine 08/29/2022 0.91  0.50 - 1.40 mg/dL Final    Calcium 08/29/2022 8.5  8.4 - 10.2 mg/dL Final    Anion Gap 08/29/2022 10  8 - 16 mmol/L Final    eGFR 08/29/2022 >60  >60 mL/min/1.73 m^2 Final    POCT Glucose 08/29/2022 148 (H)  70 - 110 mg/dL Final    POCT Glucose 08/29/2022 141 (H)  70 - 110 mg/dL Final    POCT Glucose 08/29/2022 104  70 - 110 mg/dL Final    POCT Glucose 08/29/2022 122 (H)  70 - 110 mg/dL Final    WBC 08/30/2022 5.78  3.90 - 12.70 K/uL Final    RBC 08/30/2022 5.38  4.00 - 5.40 M/uL Final    Hemoglobin 08/30/2022 14.6  12.0 - 16.0 g/dL Final    Hematocrit 08/30/2022 46.0  37.0 - 48.5 % Final    MCV 08/30/2022 86  82 - 98 fL Final    MCH 08/30/2022 27.1  27.0 - 31.0 pg Final    MCHC 08/30/2022 31.7 (L)  32.0 - 36.0 g/dL Final    RDW 08/30/2022 14.5  11.5 - 14.5 % Final    Platelets 08/30/2022 316  150 - 450 K/uL Final    MPV 08/30/2022 9.0 (L)  9.2 - 12.9 fL Final    Sodium 08/30/2022 140  136 - 145 mmol/L Final    Potassium 08/30/2022 4.7  3.5 - 5.1 mmol/L Final    Chloride 08/30/2022 102  95 - 110 mmol/L Final    CO2  08/30/2022 28  22 - 31 mmol/L Final    Glucose 08/30/2022 141 (H)  70 - 110 mg/dL Final    BUN 08/30/2022 14  7 - 18 mg/dL Final    Creatinine 08/30/2022 0.90  0.50 - 1.40 mg/dL Final    Calcium 08/30/2022 9.0  8.4 - 10.2 mg/dL Final    Anion Gap 08/30/2022 10  8 - 16 mmol/L Final    eGFR 08/30/2022 >60  >60 mL/min/1.73 m^2 Final    POCT Glucose 08/30/2022 120 (H)  70 - 110 mg/dL Final   Admission on 08/28/2022, Discharged on 08/28/2022   Component Date Value Ref Range Status    POC WBC 08/28/2022 7.4  3.9 - 12.7 K/uL Final    POC GRA% 08/28/2022 59.7  38.0 - 73.0 % Final    POC MID% 08/28/2022 8.4  4.0 - 15.0 % Final    POC LYM% 08/28/2022 31.9  18.0 - 48.0 % Final    POC Gran# 08/28/2022 4.4  1.8 - 7.7 K/uL Final    POC MID# 08/28/2022 0.7  0.3 - 1.0 K/uL Final    POC LYM# 08/28/2022 2.3  1.0 - 4.8 K/uL Final    POC RBC 08/28/2022 5.32  4.00 - 5.40 M/uL Final    POC HGB 08/28/2022 14.4  12.0 - 16.0 g/dL Final    POC MCV 08/28/2022 82.0  82.0 - 98.0 fl Final    POC HCT 08/28/2022 43.7  37.0 - 48.5 % Final    POC MCH 08/28/2022 27.1  27.0 - 31.0 pg Final    POC MCHC 08/28/2022 33.1  32.0 - 36.0 g/dL Final    POC RDW% 08/28/2022 13.2  11.5 - 14.5 % Final    POC PLT 08/28/2022 356  150 - 450 K/uL Final    POC MPV 08/28/2022 7.6 (L)  9.2 - 12.9 fl Final    Sed Rate 08/28/2022 50 (H)  0 - 29 mm/Hr Final    CRP 08/28/2022 3.30 (H)  0.00 - 0.90 mg/dL Final    POC Sodium 08/28/2022 140  128 - 145 mmol/L Final    POC Potassium 08/28/2022 4.7  3.6 - 5.1 mmol/L Final    POC Chloride 08/28/2022 109 (H)  98 - 108 mmol/L Final    POC CO2 08/28/2022 23  18 - 33 mmol/L Final    POC Glucose 08/28/2022 89  73 - 118 mg/dL Final    POC BUN 08/28/2022 12  7 - 22 mg/dL Final    POC Creatinine 08/28/2022 0.8  0.6 - 1.2 mg/dL Final    Calcium, POC 08/28/2022 9.8  8.0 - 10.3 mg/dL Final    Albumin, POC 08/28/2022 3.6  3.3 - 5.5 g/dL Final    POC ALT 08/28/2022 34  10 - 47 U/L Final    POC AST 08/28/2022 27  11 - 38 U/L Final    Alkaline  Phosphatase, POC 08/28/2022 58  42 - 141 U/L Final    POC TOTAL PROTEIN 08/28/2022 7.1  6.4 - 8.1 g/dL Final    POC TOTAL BILIRUBIN 08/28/2022 0.5  0.2 - 1.6 mg/dL Final    POC eGFR 08/28/2022 >60  61 - 2,000 mL/min Final    POC Hemolysis 08/28/2022 0   Final    Specimen Type 08/28/2022 BLNK   Final   Patient Message on 08/01/2022   Component Date Value Ref Range Status    Cholesterol 09/13/2022 171  100 - 199 mg/dL Final    Triglycerides 09/13/2022 76  0 - 149 mg/dL Final    HDL 09/13/2022 63  >39 mg/dL Final    VLDL Cholesterol Adam 09/13/2022 14  5 - 40 mg/dL Final    LDL Calculated 09/13/2022 94  0 - 99 mg/dL Final    Hemoglobin A1c 09/13/2022 6.5 (H)  4.8 - 5.6 % Final    Glucose 09/13/2022 77  65 - 99 mg/dL Final    BUN 09/13/2022 12  6 - 24 mg/dL Final    Creatinine 09/13/2022 0.92  0.57 - 1.00 mg/dL Final    eGFR 09/13/2022 75  >59 mL/min/1.73 Final    BUN/Creatinine Ratio 09/13/2022 13  9 - 23 Final    Sodium 09/13/2022 140  134 - 144 mmol/L Final    Potassium 09/13/2022 4.8  3.5 - 5.2 mmol/L Final    Chloride 09/13/2022 99  96 - 106 mmol/L Final    CO2 09/13/2022 23  20 - 29 mmol/L Final    Calcium 09/13/2022 9.7  8.7 - 10.2 mg/dL Final    Protein, Total 09/13/2022 7.4  6.0 - 8.5 g/dL Final    Albumin 09/13/2022 4.5  3.8 - 4.9 g/dL Final    Globulin, Total 09/13/2022 2.9  1.5 - 4.5 g/dL Final    Albumin/Globulin Ratio 09/13/2022 1.6  1.2 - 2.2 Final    Total Bilirubin 09/13/2022 0.5  0.0 - 1.2 mg/dL Final    Alkaline Phosphatase 09/13/2022 64  44 - 121 IU/L Final    AST 09/13/2022 23  0 - 40 IU/L Final    ALT 09/13/2022 33 (H)  0 - 32 IU/L Final    Creatinine, Urine 09/13/2022 204.7  Not Estab. mg/dL Final    Microalb, Ur 09/13/2022 21.9  Not Estab. ug/mL Final    Microalb/Crt. Ratio 09/13/2022 11  0 - 29 mg/g creat Final    TSH 09/13/2022 2.630  0.450 - 4.500 uIU/mL Final       Past Medical History:   Diagnosis Date    History of avascular necrosis of capital femoral epiphysis      Past Surgical History:    Procedure Laterality Date    HIP ARTHROPLASTY Right 2007    avascular necrosis    TONSILLECTOMY      UVULOPALATOPHARYNGOPLASTY AND SEPTOPLASTY       Family History   Problem Relation Age of Onset    Hypertension Father     Diabetes Father     Peripheral vascular disease Father     Prostate cancer Father     Multiple sclerosis Sister        Marital Status: Single  Alcohol History:  reports current alcohol use.  Tobacco History:  reports that she quit smoking about 11 years ago. She has never used smokeless tobacco.  Drug History:  reports no history of drug use.    Review of patient's allergies indicates:   Allergen Reactions    Lisinopril      Other reaction(s): COUGH  Cough  Cough  Cough      Penicillin      Other reaction(s): Unknown       Current Outpatient Medications:     acetaminophen (TYLENOL) 650 MG TbSR, Take 1,300 mg by mouth as needed (pain)., Disp: , Rfl:     amLODIPine (NORVASC) 2.5 MG tablet, Take 1 tablet (2.5 mg total) by mouth once daily., Disp: 90 tablet, Rfl: 3    baclofen (LIORESAL) 10 MG tablet, Take 3 tablets (30 mg total) by mouth every morning., Disp: , Rfl:     buPROPion (WELLBUTRIN XL) 300 MG 24 hr tablet, Take 1 tablet (300 mg total) by mouth once daily., Disp: 90 tablet, Rfl: 3    cetirizine (ZYRTEC) 10 MG tablet, Take 10 mg by mouth daily as needed for Allergies., Disp: , Rfl:     diphenhydrAMINE (BENYLIN) 12.5 mg/5 mL liquid, Take 50 mg by mouth nightly as needed for Insomnia., Disp: , Rfl:     dulaglutide (TRULICITY) 3 mg/0.5 mL pen injector, Inject 3 mg into the skin every Saturday., Disp: , Rfl:     fluticasone propionate (FLONASE) 50 mcg/actuation nasal spray, 2 sprays by Each Nostril route as needed for Allergies., Disp: , Rfl:     insulin glargine U-300 conc (TOUJEO MAX U-300 SOLOSTAR) 300 unit/mL (3 mL) insulin pen, Inject 60 Units into the skin once daily., Disp: 6 pen, Rfl: 3    levothyroxine (SYNTHROID) 50 MCG tablet, Take 1 tablet (50 mcg total) by mouth before breakfast.,  "Disp: 90 tablet, Rfl: 3    losartan (COZAAR) 100 MG tablet, Take 1 tablet (100 mg total) by mouth every morning., Disp: , Rfl:     spironolactone (ALDACTONE) 100 MG tablet, Take 1 tablet (100 mg total) by mouth once daily., Disp: 90 tablet, Rfl: 3    carboxymethylcellulose-citric (PLENITY, WELCOME KIT,) 0.75 gram Cap, Take 1 Dose by mouth 2 (two) times daily with meals., Disp: , Rfl:     JARDIANCE 25 mg tablet, Take 1 tablet (25 mg total) by mouth every morning., Disp: 30 tablet, Rfl: 1    levomefolate calcium (ELFOLATE) 15 mg Tab, Take 15 mg by mouth every morning., Disp: , Rfl:     naproxen sodium (ANAPROX) 220 MG tablet, Take 440 mg by mouth as needed (pain)., Disp: , Rfl:     pen needle, diabetic (BD JENNIFER 2ND GEN PEN NEEDLE) 32 gauge x 5/32" Ndle, Inject 1 each into the skin once daily., Disp: 100 each, Rfl: 3    triamcinolone acetonide 0.1% (KENALOG) 0.1 % ointment, Apply topically 2 (two) times daily., Disp: 453.6 g, Rfl: 1    Review of Systems   All other systems reviewed and are negative.     Objective:      Vitals:    09/15/22 1225   BP: 108/74   Pulse: 78   Weight: 113.4 kg (250 lb)   Height: 5' 5" (1.651 m)     Physical Exam  Constitutional:       Appearance: Normal appearance.   HENT:      Head: Normocephalic and atraumatic.      Mouth/Throat:      Mouth: Mucous membranes are moist.   Eyes:      Conjunctiva/sclera: Conjunctivae normal.   Cardiovascular:      Pulses:           Dorsalis pedis pulses are 2+ on the right side and 2+ on the left side.   Pulmonary:      Effort: Pulmonary effort is normal.   Musculoskeletal:      Right foot: Normal range of motion.      Left foot: Normal range of motion.   Feet:      Right foot:      Toenail Condition: Right toenails are normal.      Left foot:      Toenail Condition: Left toenails are normal.   Skin:     Findings: Rash present. Rash is scaling.      Comments: Erythematous plaques to bilateral ankles.    Neurological:      General: No focal deficit present.     " " Mental Status: She is alert and oriented to person, place, and time.   Psychiatric:         Mood and Affect: Mood normal.         Behavior: Behavior normal.       Assessment:       1. Type 2 diabetes mellitus with other diabetic kidney complication    2. Essential hypertension    3. Cellulitis and abscess of hand    4. Acquired hypothyroidism    5. Morbid (severe) obesity due to excess calories    6. Menopausal syndrome (hot flashes)    7. Intrinsic eczema    8. Colon cancer screening           Plan:       Type 2 diabetes mellitus with other diabetic kidney complication  -     Foot Exam Performed  -     pen needle, diabetic (BD JENNIFER 2ND GEN PEN NEEDLE) 32 gauge x 5/32" Ndle; Inject 1 each into the skin once daily.  Dispense: 100 each; Refill: 3    Essential hypertension  Controlled    Cellulitis and abscess of hand  Resolved    Acquired hypothyroidism  Stable on medications    Morbid (severe) obesity due to excess calories  -     carboxymethylcellulose-citric (PLENITY, WELCOME KIT,) 0.75 gram Cap; Take 1 Dose by mouth 2 (two) times daily with meals.    Menopausal syndrome (hot flashes)    Colon cancer screening  -     Cologuard Screening (Multitarget Stool DNA); Future; Expected date: 09/15/2022    Intrinsic eczema  -     triamcinolone acetonide 0.1% (KENALOG) 0.1 % ointment; Apply topically 2 (two) times daily.  Dispense: 453.6 g; Refill: 1    Follow up in about 6 months (around 3/15/2023) for Annual Physical, Diabetic Check-Up.              "

## 2022-10-08 LAB — NONINV COLON CA DNA+OCC BLD SCRN STL QL: POSITIVE

## 2022-10-14 ENCOUNTER — TELEPHONE (OUTPATIENT)
Dept: FAMILY MEDICINE | Facility: CLINIC | Age: 52
End: 2022-10-14

## 2022-10-14 DIAGNOSIS — R19.5 POSITIVE COLORECTAL CANCER SCREENING USING COLOGUARD TEST: Primary | ICD-10-CM

## 2022-10-14 NOTE — TELEPHONE ENCOUNTER
----- Message from Angel Kennedy MD sent at 10/10/2022  7:01 PM CDT -----  Please call the patient regarding her abnormal result. Her cologuard was positive and she will be  referred to GI for diagnostic colonscopy. Does she prefer to be seen by GI in [place]

## 2022-10-14 NOTE — TELEPHONE ENCOUNTER
Spoke with patient notified of positive cologuard results, and referral to GI/Dr Hawk.  Patient verbalized understanding.  Referral pended for MD approval -JENNIFER

## 2022-11-29 ENCOUNTER — PATIENT MESSAGE (OUTPATIENT)
Dept: FAMILY MEDICINE | Facility: CLINIC | Age: 52
End: 2022-11-29

## 2022-11-29 ENCOUNTER — NURSE TRIAGE (OUTPATIENT)
Dept: ADMINISTRATIVE | Facility: CLINIC | Age: 52
End: 2022-11-29
Payer: COMMERCIAL

## 2022-11-29 DIAGNOSIS — E11.29 TYPE 2 DIABETES MELLITUS WITH OTHER DIABETIC KIDNEY COMPLICATION: ICD-10-CM

## 2022-11-29 RX ORDER — EMPAGLIFLOZIN 25 MG/1
25 TABLET, FILM COATED ORAL EVERY MORNING
Qty: 30 TABLET | Refills: 1 | Status: SHIPPED | OUTPATIENT
Start: 2022-11-29 | End: 2023-02-23 | Stop reason: SDUPTHER

## 2022-11-29 NOTE — TELEPHONE ENCOUNTER
Pt was admitted to hospital in August. Pt states that the hospitalist re-prescribed all of her medications at discharge and since then, she has had difficulty obtaining her RX.    Today pt is specifically requesting a refill of her Jardiance.    NT left VM with pt's PCP and will send a high priority message for PCP's to call pt back directly. Pt instructed to call back in 1 hour if she has not heard from PCP's staff. Pt verbalized understanding.   Reason for Disposition   Prescription not at pharmacy and was prescribed by PCP recently  (Exception: triager has access to EMR and prescription is recorded there. Go to Home Care and confirm for pharmacy.)    Protocols used: Medication Refill and Renewal Call-A-[place]

## 2022-12-12 ENCOUNTER — PATIENT MESSAGE (OUTPATIENT)
Dept: FAMILY MEDICINE | Facility: CLINIC | Age: 52
End: 2022-12-12

## 2022-12-12 DIAGNOSIS — E11.29 TYPE 2 DIABETES MELLITUS WITH OTHER DIABETIC KIDNEY COMPLICATION: ICD-10-CM

## 2022-12-12 RX ORDER — INSULIN GLARGINE 300 U/ML
60 INJECTION, SOLUTION SUBCUTANEOUS DAILY
Qty: 2 PEN | Refills: 1 | Status: SHIPPED | OUTPATIENT
Start: 2022-12-12 | End: 2024-02-23 | Stop reason: SDUPTHER

## 2022-12-12 RX ORDER — DULAGLUTIDE 3 MG/.5ML
3 INJECTION, SOLUTION SUBCUTANEOUS
Qty: 12 PEN | Refills: 1 | Status: SHIPPED | OUTPATIENT
Start: 2022-12-12 | End: 2023-03-07

## 2022-12-12 RX ORDER — INSULIN GLARGINE 300 U/ML
60 INJECTION, SOLUTION SUBCUTANEOUS DAILY
Qty: 6 PEN | Refills: 3 | Status: SHIPPED | OUTPATIENT
Start: 2022-12-12 | End: 2023-12-12

## 2022-12-12 RX ORDER — DULAGLUTIDE 3 MG/.5ML
3 INJECTION, SOLUTION SUBCUTANEOUS
Qty: 2 PEN | Refills: 1 | Status: SHIPPED | OUTPATIENT
Start: 2022-12-12 | End: 2023-03-07

## 2022-12-12 NOTE — TELEPHONE ENCOUNTER
prescription sent to   [facility] - [place], [place] - [facility]  [address] [facility] [address] [facility] [address]  Phone: [phone] Fax: [phone]    [id number] - [place] - [address]  [address]  [place] [address]  Phone: [phone] Fax: [phone]

## 2023-01-31 ENCOUNTER — PATIENT MESSAGE (OUTPATIENT)
Dept: FAMILY MEDICINE | Facility: CLINIC | Age: 53
End: 2023-01-31

## 2023-01-31 DIAGNOSIS — I10 ESSENTIAL HYPERTENSION: ICD-10-CM

## 2023-01-31 DIAGNOSIS — E03.9 ACQUIRED HYPOTHYROIDISM: ICD-10-CM

## 2023-01-31 DIAGNOSIS — F33.0 MAJOR DEPRESSIVE DISORDER, RECURRENT, MILD: ICD-10-CM

## 2023-02-01 RX ORDER — LEVOTHYROXINE SODIUM 50 UG/1
50 TABLET ORAL
Qty: 90 TABLET | Refills: 3 | Status: SHIPPED | OUTPATIENT
Start: 2023-02-01 | End: 2024-02-23 | Stop reason: SDUPTHER

## 2023-02-01 RX ORDER — LOSARTAN POTASSIUM 100 MG/1
100 TABLET ORAL EVERY MORNING
Qty: 90 TABLET | Refills: 3 | Status: SHIPPED | OUTPATIENT
Start: 2023-02-01 | End: 2024-02-23 | Stop reason: SDUPTHER

## 2023-02-01 RX ORDER — BACLOFEN 10 MG/1
30 TABLET ORAL EVERY MORNING
Qty: 45 TABLET | Refills: 0 | Status: SHIPPED | OUTPATIENT
Start: 2023-02-01 | End: 2023-09-21 | Stop reason: SDUPTHER

## 2023-02-01 RX ORDER — BUPROPION HYDROCHLORIDE 300 MG/1
300 TABLET ORAL DAILY
Qty: 90 TABLET | Refills: 3 | Status: SHIPPED | OUTPATIENT
Start: 2023-02-01 | End: 2024-02-12 | Stop reason: SDUPTHER

## 2023-02-01 RX ORDER — BACLOFEN 10 MG/1
30 TABLET ORAL EVERY MORNING
Qty: 270 TABLET | Refills: 1 | Status: SHIPPED | OUTPATIENT
Start: 2023-02-01 | End: 2023-10-13 | Stop reason: SDUPTHER

## 2023-02-01 RX ORDER — AMLODIPINE BESYLATE 2.5 MG/1
2.5 TABLET ORAL DAILY
Qty: 90 TABLET | Refills: 3 | Status: SHIPPED | OUTPATIENT
Start: 2023-02-01 | End: 2024-02-23 | Stop reason: SDUPTHER

## 2023-02-01 NOTE — TELEPHONE ENCOUNTER
prescription sent to   [facility] - [place], [place] - [address] [place] [address]  Phone: [phone] Fax: [phone]

## 2023-02-23 ENCOUNTER — PATIENT MESSAGE (OUTPATIENT)
Dept: FAMILY MEDICINE | Facility: CLINIC | Age: 53
End: 2023-02-23

## 2023-02-23 DIAGNOSIS — E11.29 TYPE 2 DIABETES MELLITUS WITH OTHER DIABETIC KIDNEY COMPLICATION: ICD-10-CM

## 2023-02-23 RX ORDER — EMPAGLIFLOZIN 25 MG/1
25 TABLET, FILM COATED ORAL EVERY MORNING
Qty: 14 TABLET | Refills: 0 | Status: SHIPPED | OUTPATIENT
Start: 2023-02-23 | End: 2024-02-12

## 2023-02-23 RX ORDER — EMPAGLIFLOZIN 25 MG/1
25 TABLET, FILM COATED ORAL EVERY MORNING
Qty: 90 TABLET | Refills: 3 | Status: SHIPPED | OUTPATIENT
Start: 2023-02-23 | End: 2024-02-23 | Stop reason: SDUPTHER

## 2023-02-23 NOTE — TELEPHONE ENCOUNTER
prescription sent to   [facility] - [place], [place] - [facility]  [address] [facility] [address]  Phone: [phone] Fax: [phone]    [id number] - [place] - [address]  [address]  [place] [address]  Phone: [phone] Fax: [phone]

## 2023-03-07 ENCOUNTER — OFFICE VISIT (OUTPATIENT)
Dept: FAMILY MEDICINE | Facility: CLINIC | Age: 53
End: 2023-03-07
Payer: COMMERCIAL

## 2023-03-07 VITALS
HEIGHT: 65 IN | HEART RATE: 76 BPM | WEIGHT: 265.38 LBS | OXYGEN SATURATION: 96 % | BODY MASS INDEX: 44.21 KG/M2 | DIASTOLIC BLOOD PRESSURE: 68 MMHG | SYSTOLIC BLOOD PRESSURE: 106 MMHG

## 2023-03-07 DIAGNOSIS — E03.9 ACQUIRED HYPOTHYROIDISM: ICD-10-CM

## 2023-03-07 DIAGNOSIS — E66.01 MORBID (SEVERE) OBESITY DUE TO EXCESS CALORIES: ICD-10-CM

## 2023-03-07 DIAGNOSIS — B00.9 HSV INFECTION: ICD-10-CM

## 2023-03-07 DIAGNOSIS — E11.29 TYPE 2 DIABETES MELLITUS WITH OTHER DIABETIC KIDNEY COMPLICATION: Primary | ICD-10-CM

## 2023-03-07 DIAGNOSIS — K63.5 HYPERPLASTIC POLYP OF SIGMOID COLON: ICD-10-CM

## 2023-03-07 DIAGNOSIS — I10 ESSENTIAL HYPERTENSION: ICD-10-CM

## 2023-03-07 DIAGNOSIS — L68.0 FEMALE HIRSUTISM: ICD-10-CM

## 2023-03-07 DIAGNOSIS — F33.0 MAJOR DEPRESSIVE DISORDER, RECURRENT, MILD: ICD-10-CM

## 2023-03-07 LAB — HBA1C MFR BLD: 7.4 %

## 2023-03-07 PROCEDURE — 3051F HG A1C>EQUAL 7.0%<8.0%: CPT | Mod: CPTII,S$GLB,, | Performed by: FAMILY MEDICINE

## 2023-03-07 PROCEDURE — 99214 OFFICE O/P EST MOD 30 MIN: CPT | Mod: S$GLB,,, | Performed by: FAMILY MEDICINE

## 2023-03-07 PROCEDURE — 99214 PR OFFICE/OUTPT VISIT, EST, LEVL IV, 30-39 MIN: ICD-10-PCS | Mod: S$GLB,,, | Performed by: FAMILY MEDICINE

## 2023-03-07 PROCEDURE — 3078F DIAST BP <80 MM HG: CPT | Mod: CPTII,S$GLB,, | Performed by: FAMILY MEDICINE

## 2023-03-07 PROCEDURE — 3051F PR MOST RECENT HEMOGLOBIN A1C LEVEL 7.0 - < 8.0%: ICD-10-PCS | Mod: CPTII,S$GLB,, | Performed by: FAMILY MEDICINE

## 2023-03-07 PROCEDURE — 4010F ACE/ARB THERAPY RXD/TAKEN: CPT | Mod: CPTII,S$GLB,, | Performed by: FAMILY MEDICINE

## 2023-03-07 PROCEDURE — 3008F BODY MASS INDEX DOCD: CPT | Mod: CPTII,S$GLB,, | Performed by: FAMILY MEDICINE

## 2023-03-07 PROCEDURE — 3008F PR BODY MASS INDEX (BMI) DOCUMENTED: ICD-10-PCS | Mod: CPTII,S$GLB,, | Performed by: FAMILY MEDICINE

## 2023-03-07 PROCEDURE — 83036 POCT HEMOGLOBIN A1C: ICD-10-PCS | Mod: QW,,, | Performed by: FAMILY MEDICINE

## 2023-03-07 PROCEDURE — 1159F PR MEDICATION LIST DOCUMENTED IN MEDICAL RECORD: ICD-10-PCS | Mod: CPTII,S$GLB,, | Performed by: FAMILY MEDICINE

## 2023-03-07 PROCEDURE — 3074F PR MOST RECENT SYSTOLIC BLOOD PRESSURE < 130 MM HG: ICD-10-PCS | Mod: CPTII,S$GLB,, | Performed by: FAMILY MEDICINE

## 2023-03-07 PROCEDURE — 83036 HEMOGLOBIN GLYCOSYLATED A1C: CPT | Mod: QW,,, | Performed by: FAMILY MEDICINE

## 2023-03-07 PROCEDURE — 3074F SYST BP LT 130 MM HG: CPT | Mod: CPTII,S$GLB,, | Performed by: FAMILY MEDICINE

## 2023-03-07 PROCEDURE — 1159F MED LIST DOCD IN RCRD: CPT | Mod: CPTII,S$GLB,, | Performed by: FAMILY MEDICINE

## 2023-03-07 PROCEDURE — 4010F PR ACE/ARB THEARPY RXD/TAKEN: ICD-10-PCS | Mod: CPTII,S$GLB,, | Performed by: FAMILY MEDICINE

## 2023-03-07 PROCEDURE — 3078F PR MOST RECENT DIASTOLIC BLOOD PRESSURE < 80 MM HG: ICD-10-PCS | Mod: CPTII,S$GLB,, | Performed by: FAMILY MEDICINE

## 2023-03-07 RX ORDER — SPIRONOLACTONE 100 MG/1
100 TABLET, FILM COATED ORAL DAILY
Qty: 90 TABLET | Refills: 3 | Status: SHIPPED | OUTPATIENT
Start: 2023-03-07 | End: 2024-02-23 | Stop reason: SDUPTHER

## 2023-03-07 RX ORDER — TIRZEPATIDE 5 MG/.5ML
5 INJECTION, SOLUTION SUBCUTANEOUS
Qty: 12 PEN | Refills: 1 | Status: SHIPPED | OUTPATIENT
Start: 2023-03-07 | End: 2023-09-01 | Stop reason: SDUPTHER

## 2023-03-07 RX ORDER — VALACYCLOVIR HYDROCHLORIDE 500 MG/1
500 TABLET, FILM COATED ORAL 3 TIMES DAILY
Qty: 90 TABLET | Refills: 1 | Status: SHIPPED | OUTPATIENT
Start: 2023-03-07

## 2023-03-07 NOTE — PROGRESS NOTES
SUBJECTIVE:    Patient ID: Demetrice Palmer is a 52 y.o. female.    Chief Complaint: Follow-up (Follow up/ A1c/ declines eye referral/discuss weigovy and tramadol//mp)    Patient with type 2 diabetes presents for her regular evaluation.  Unfortunately patient has had a progressive increase in her A1c secondary to dietary indiscretions.  Additionally she has had some weight gain.  She is on appropriate therapy however I think this therapy may not be enough.      Patient has a history of right hip arthroplasty secondary to avascular necrosis.  She feels that her weight gain is producing some additional pain.  She has been using Tylenol and Aleve.    Blood pressure is showing good control.  Up-to-date with colonoscopy after having positive cologuard .  Performed in December.  One polyp was found in the sigmoid colon.  Pathology revealed hyperplastic polyp.    Patient has h/o HSV and has had a recent breakout and would like to restart suppressive therapy        Office Visit on 03/07/2023   Component Date Value Ref Range Status    Hemoglobin A1C, POC 03/07/2023 7.4  % Final   Admission on 12/30/2022, Discharged on 12/30/2022   Component Date Value Ref Range Status    POCT Glucose 12/30/2022 145 (H)  70 - 110 mg/dL Final   Office Visit on 09/15/2022   Component Date Value Ref Range Status    Cologuard Result 10/03/2022 Positive (A)  Negative Final      Past Medical History:   Diagnosis Date    History of avascular necrosis of capital femoral epiphysis      Past Surgical History:   Procedure Laterality Date    COLONOSCOPY N/A 12/30/2022    Procedure: COLONOSCOPY;  Surgeon: Justin Hawk MD;  Location: [facility];  Service: Endoscopy;  Laterality: N/A;    HIP ARTHROPLASTY Right 2007    avascular necrosis    TONSILLECTOMY      UVULOPALATOPHARYNGOPLASTY AND SEPTOPLASTY       Family History   Problem Relation Age of Onset    Hypertension Father     Diabetes Father     Peripheral vascular disease Father     Prostate  cancer Father     Multiple sclerosis Sister        Marital Status: Single  Alcohol History:  reports current alcohol use.  Tobacco History:  reports that she quit smoking about 12 years ago. Her smoking use included cigarettes. She has never used smokeless tobacco.  Drug History:  reports no history of drug use.    Review of patient's allergies indicates:   Allergen Reactions    Lisinopril      Other reaction(s): COUGH  Cough  Cough  Cough      Penicillin      Other reaction(s): Unknown       Current Outpatient Medications:     acetaminophen (TYLENOL) 650 MG TbSR, Take 1,300 mg by mouth as needed (pain)., Disp: , Rfl:     amLODIPine (NORVASC) 2.5 MG tablet, Take 1 tablet (2.5 mg total) by mouth once daily., Disp: 90 tablet, Rfl: 3    baclofen (LIORESAL) 10 MG tablet, Take 3 tablets (30 mg total) by mouth every morning., Disp: 270 tablet, Rfl: 1    baclofen (LIORESAL) 10 MG tablet, Take 3 tablets (30 mg total) by mouth every morning., Disp: 45 tablet, Rfl: 0    buPROPion (WELLBUTRIN XL) 300 MG 24 hr tablet, Take 1 tablet (300 mg total) by mouth once daily., Disp: 90 tablet, Rfl: 3    cetirizine (ZYRTEC) 10 MG tablet, Take 10 mg by mouth daily as needed for Allergies., Disp: , Rfl:     diphenhydrAMINE (BENYLIN) 12.5 mg/5 mL liquid, Take 50 mg by mouth nightly as needed for Insomnia., Disp: , Rfl:     fluticasone propionate (FLONASE) 50 mcg/actuation nasal spray, 2 sprays by Each Nostril route as needed for Allergies., Disp: , Rfl:     insulin glargine U-300 conc (TOUJEO MAX U-300 SOLOSTAR) 300 unit/mL (3 mL) insulin pen, Inject 60 Units into the skin once daily., Disp: 6 pen, Rfl: 3    insulin glargine U-300 conc (TOUJEO MAX U-300 SOLOSTAR) 300 unit/mL (3 mL) insulin pen, Inject 60 Units into the skin once daily., Disp: 2 pen, Rfl: 1    JARDIANCE 25 mg tablet, Take 1 tablet (25 mg total) by mouth every morning., Disp: 14 tablet, Rfl: 0    JARDIANCE 25 mg tablet, Take 1 tablet (25 mg total) by mouth every morning.,  "Disp: 90 tablet, Rfl: 3    levomefolate calcium (ELFOLATE) 15 mg Tab, Take 15 mg by mouth every morning., Disp: , Rfl:     levothyroxine (SYNTHROID) 50 MCG tablet, Take 1 tablet (50 mcg total) by mouth before breakfast., Disp: 90 tablet, Rfl: 3    losartan (COZAAR) 100 MG tablet, Take 1 tablet (100 mg total) by mouth every morning., Disp: 90 tablet, Rfl: 3    naproxen sodium (ANAPROX) 220 MG tablet, Take 440 mg by mouth as needed (pain)., Disp: , Rfl:     pen needle, diabetic (BD JENNIFER 2ND GEN PEN NEEDLE) 32 gauge x 5/32" Ndle, Inject 1 each into the skin once daily., Disp: 100 each, Rfl: 3    triamcinolone acetonide 0.1% (KENALOG) 0.1 % ointment, Apply topically 2 (two) times daily., Disp: 453.6 g, Rfl: 1    spironolactone (ALDACTONE) 100 MG tablet, Take 1 tablet (100 mg total) by mouth once daily., Disp: 90 tablet, Rfl: 3    tirzepatide (MOUNJARO) 5 mg/0.5 mL PnIj, Inject 5 mg into the skin every 7 days., Disp: 12 pen, Rfl: 1    valACYclovir (VALTREX) 500 MG tablet, Take 1 tablet (500 mg total) by mouth 3 (three) times daily. For 3 days.Then take 1 tablet daily, Disp: 90 tablet, Rfl: 1    Review of Systems   Constitutional:  Negative for activity change, fatigue and unexpected weight change.   HENT:  Negative for hearing loss, postnasal drip, sinus pressure, sore throat and voice change.    Eyes:  Negative for photophobia and visual disturbance.   Respiratory:  Negative for cough, shortness of breath and wheezing.    Cardiovascular:  Negative for chest pain and palpitations.   Gastrointestinal:  Negative for constipation, diarrhea and nausea.   Genitourinary:  Negative for difficulty urinating, frequency, hematuria and urgency.   Musculoskeletal:  Positive for arthralgias. Negative for back pain.   Skin:  Negative for rash.   Neurological:  Negative for weakness, light-headedness and headaches.   Hematological:  Negative for adenopathy. Does not bruise/bleed easily.   Psychiatric/Behavioral:  The patient is not " "nervous/anxious.         Objective:      Vitals:    03/07/23 1550   BP: 106/68   Pulse: 76   SpO2: 96%   Weight: 120.4 kg (265 lb 6.4 oz)   Height: 5' 5" (1.651 m)     Physical Exam  Constitutional:       Appearance: Normal appearance. She is obese.   HENT:      Head: Normocephalic and atraumatic.      Mouth/Throat:      Mouth: Mucous membranes are moist.   Eyes:      Conjunctiva/sclera: Conjunctivae normal.   Pulmonary:      Effort: Pulmonary effort is normal.   Neurological:      General: No focal deficit present.      Mental Status: She is alert and oriented to person, place, and time.   Psychiatric:         Mood and Affect: Mood normal.         Behavior: Behavior normal.         Assessment:       1. Type 2 diabetes mellitus with other diabetic kidney complication    2. Acquired hypothyroidism    3. Essential hypertension    4. HSV infection    5. Major depressive disorder, recurrent, mild    6. Hyperplastic polyp of sigmoid colon    7. Female hirsutism    8. Morbid (severe) obesity due to excess calories           Plan:       Type 2 diabetes mellitus with other diabetic kidney complication  -     Hemoglobin A1C, POCT  -     tirzepatide (MOUNJARO) 5 mg/0.5 mL PnIj; Inject 5 mg into the skin every 7 days.  Dispense: 12 pen; Refill: 1  D/u trulicity    Acquired hypothyroidism  Stable    Essential hypertension  Controlled    HSV infection  -     valACYclovir (VALTREX) 500 MG tablet; Take 1 tablet (500 mg total) by mouth 3 (three) times daily. For 3 days.Then take 1 tablet daily  Dispense: 90 tablet; Refill: 1    Major depressive disorder, recurrent, mild  Stable    Hyperplastic polyp of sigmoid colon  Repeat colonscopy in 2-3 years    Female hirsutism  -     spironolactone (ALDACTONE) 100 MG tablet; Take 1 tablet (100 mg total) by mouth once daily.  Dispense: 90 tablet; Refill: 3    Morbid (severe) obesity due to excess calories      Follow up in about 4 months (around 7/7/2023) for HTN, Diabetic Check-Up.    " yes

## 2023-04-11 ENCOUNTER — PATIENT MESSAGE (OUTPATIENT)
Dept: ADMINISTRATIVE | Facility: HOSPITAL | Age: 53
End: 2023-04-11
Payer: COMMERCIAL

## 2023-07-31 ENCOUNTER — PATIENT OUTREACH (OUTPATIENT)
Dept: ADMINISTRATIVE | Facility: HOSPITAL | Age: 53
End: 2023-07-31
Payer: COMMERCIAL

## 2023-07-31 DIAGNOSIS — Z12.31 ENCOUNTER FOR SCREENING MAMMOGRAM FOR BREAST CANCER: Primary | ICD-10-CM

## 2023-07-31 NOTE — PROGRESS NOTES
Population Health Chart Review & Patient Outreach Details:     Reason for Outreach Encounter:     [x]  Non-Compliant Report   []  Payor Report (Humana, PHN, BCBS, MSSP, MCIP, UHC, etc.)   []  Pre-Visit Chart Review     Updates Requested / Reviewed:     [x]  Care Everywhere    [x]     [x]  External Sources ([facility], [facility], DIS, etc.)   [x]  Care Team Updated    Patient Outreach Method:    [x]  Telephone Outreach Completed   [x] Successful   [] Left Voicemail   [] Unable to Contact (wrong number, no voicemail)  []  [facility] Outreach Sent  []  Letter Outreach Mailed  [x]  Fax Sent for External Records  []  External Records Upload    Health Maintenance Topics Addressed and Outreach Outcomes / Actions Taken:        [x]      Breast Cancer Screening []  Mammo Scheduled      []  External Records Requested     []  Added Reminder to Complete to Upcoming Primary Care Appt Notes     []  Patient Declined     []  Patient Will Call Back to Schedule     [x]  Patient Will Schedule with External Provider / Order Routed if Applicable             [x]       Cervical Cancer Screening []  Pap Scheduled      []  External Records Requested     []  Added Reminder to Complete to Upcoming Primary Care Appt Notes     []  Patient Declined     []  Patient Will Call Back to Schedule     [x]  Patient Will Schedule with External Provider               []          Colorectal Cancer Screening []  Colonoscopy Case Request or Referral Placed     []  External Records Requested     []  Added Reminder to Complete to Upcoming Primary Care Appt Notes     []  Patient Declined     []  Patient Will Call Back to Schedule     []  Patient Will Schedule with External Provider     []  Fit Kit Mailed (add the SmartPhrase under additional notes)     []  Reminded Patient to Complete Home Test             [x]      Diabetic Eye Exam []  Eye Camera Scheduled or Optometry Referral Placed     [x]  External Records Requested     []  Added Reminder to  Complete to Upcoming Primary Care Appt Notes     []  Patient Declined     []  Patient Will Call Back to Schedule     []  Patient Will Schedule with External Provider             []      Blood Pressure Control []  Primary Care Follow Up Visit Scheduled     []  Remote Blood Pressure Reading Captured     []  Added Reminder to Complete to Upcoming Primary Care Appt Notes     []  Patient Declined     []  Patient Will Call Back / Patient Will Send Portal Message with Reading     []  Patient Will Call Back to Schedule Provider Visit             []       HbA1c & Other Labs []  Lab Appt Scheduled for Due Labs     []  Primary Care Follow Up Visit Scheduled      []  Reminded Patient to Complete Home Test     []  Added Reminder to Complete to Upcoming Primary Care Appt Notes     []  Patient Declined     []  Patient Will Call Back to Schedule     []  Patient Will Schedule with External Provider / Order Routed if Applicable           []    Schedule Primary Care Appt []  Primary Care Appt Scheduled     []  Patient Declined     []  Patient Will Call Back to Schedule     []  Pt Established with External Provider & Updated Care Team             []      Medication Adherence []  Primary Care Appointment Scheduled     []  Added Reminder to Upcoming Primary Care Appt Notes     []  Patient Reminded to  Prescription     []  Patient Declined, Provider Notified if Needed     []  Sent Provider Message to Review and/or Add Exclusion to Problem List             []      Osteoporosis Screening []  DXA Appointment Scheduled     []  External Records Requested     []  Added Reminder to Complete to Upcoming Primary Care Appt Notes     []  Patient Declined     []  Patient Will Call Back to Schedule     []  Patient Will Schedule with External Provider / Order Routed if Applicable     Additional Care Coordinator Notes:         Further Action Needed If Patient Returns Outreach:

## 2023-07-31 NOTE — LETTER
AUTHORIZATION FOR RELEASE OF   CONFIDENTIAL INFORMATION    Dear [facility],    We are seeing Demetrice Palmer, date of birth 1970, in the clinic at [facility] [address]. Angel Kennedy MD is the patient's PCP. Demetrice Palmer has an outstanding lab/procedure at the time we reviewed her chart. In order to help keep her health information updated, she has authorized us to request the following medical record(s):                                 ( X )  MAMMOGRAM ( Most Recent )                                          Please fax records to [facility], Chequita S Williams, MD, [phone]     If you have any questions, please contact     [facility]  Clinical Care Coordinator    [facility]  [phone] (Phone)  [phone] (Fax)    Patient Name: Demetrice Palmer  : 1970  Patient Phone #: [phone]             CONSENT AND ACKNOWLEDGEMENT         FORM       Demetrice Palmer  MRN: [id number]  : 1970  Age: 52 y.o.  Sex: female       MEDICARE-PATIENTS CERTIFICATION, AUTHORIZATION TO RELEASE INFORMATION AND PAYMENT REQUEST:  I certify that the information given by me in applying under the Title XVII of Social Security Act is correct.  I authorize any chen of medical or other information about me to release to the Social Security Administration or its intermediaries or carriers any information needed for this or a related Medicare claim.  I request that payment of authorized benefits be made on my behalf to [facility] and [facility] ([facility]).  I also acknowledge upon admission, that I received the Important Message from Medicare.     AUTHORIZATION TO PAY INSURANCE BENEFITS:  For and in consideration of medical services rendered to the patient named herein, I hereby assign and transfer to [facility], including but not limited to hospital based physicians,  attending physicians, consulting physicians, nurse practitioners and physicians assistants the rights for the payment of medical benefits which I may have under the policy/policies identified by me during registration or any policy which may be determined hereafter to pay benefits otherwise payable to me or to a beneficiary designated in the policy.  By this assignment, I authorize payment directly to [facility], hospital based physicians, attending physicians and consulting physicians of all medical benefits payable under the aforesaid policy/policies, but not to exceed the hospitals and/or clinic regular charges.     GUARANTEE OF ACCOUNT:  I/We certify that the information given is true and correct to the best of my/our knowledge.  I/We understand that bills are payable within thirty (30) days of the date of service.  If it becomes necessary for the account to be referred to an  or collection agency, the undersigned agrees to pay the reasonable s fees or collection expenses. I/We david permission and consent to [facility], our assignees, and third party collection agents to contact myself/us by any telephone number associated with myself/us, including wireless numbers and to leave answering machine and voicemail messages and include in any such messages, information required by law (including debt collection laws) and/or messages regarding amounts owed; to send text messages or emails using any email addresses I/we provided; to use pre-recorded/artificial voice messages  and/or an automatic dialing device in connection with any communications.   I/We agree to be responsible for the payment of all charges of this medical service and hospital based physicians, attending physicians and consulting physicians services rendered to the above named patient     COMMUNICATION AUTHORIZATION:   I hereby authorize Marybeth Granda, to contact me on my cell phone and/or home phone using  prerecorded messages, artificial voice messages, automatic telephone dialing devices or other computer assisted technology, or by electronic mail, text messaging, or by any other form of electronic communication. This includes, but is not limited to, appointment reminders, yearly physical exam reminders, preventive care reminders, patient campaigns and welcome calls. I understand I have the right to opt out of these communications at any time.        Page 1 of 3                 CONSENT AND ACKNOWLEDGEMENT FORM CONTINUED     AUTHORIZATION TO RELEASE INFORMATION:  I hereby authorize [facility] and hospital based physicians to release the information for this occasion of service requested by my insurance company or third party payor for the purpose of obtaining payment for services rendered during this admission and/or to other healthcare providers for the purpose of follow-up care or evaluation of care.  This information may or may not include mental health and/or substance abuse information.     AUTHORIZATION FOR MEDICAL AND/OR SURGICAL TREATMENT:  I hereby authorize [facility] and its employees or agents to provide hospital care incident to this admission, including without limitations, consent to routine diagnostic procedures and medical treatment, which is to include whatever procedures that are deemed necessary by the admitting doctor and such other physicians or assistants as he may designate.     PERSONAL VALUABLES:      It is understood and agreed that the hospital maintains a safe for the safekeeping of money and valuables and the hospital shall not be liable for the loss of damage to any money, jewelry, glasses, documents, dentures, hearing aids or other articles of unusual value, unless placed therein, and shall not be liable for loss or damage to any other personal property, unless deposited with the hospital for safekeeping.  VALUABLES ARE NOT TO BE LEFT IN THE PATIENTS ROOM.     ADVANCE  DIRECTIVES:  I understand that I am not required to have Advance Directives in order to be treated.  I have received written information about my rights to formulate Advance Directives.     NOTICE OF PRIVACY PRACTICES/PATIENT RIGHTS/ADMISSION PACKET:  I acknowledge that I have received copies of the [facility] Notice of Privacy Practices, Patient Rights, and the Admission packet, which contains Smoking Cessation information. I understand that weapons, illegal drugs, or any other items considered  contraband, are not allowed on the [facility] campus, and that I do not have such items in my possession.        CONSENT TO PHOTOGRAPH AND/OR VIDEO TAPE DOCUMENTATION OF CARE:  I understand that photographs, videotapes, digital, or other images may be recorded to document my care.  I acknowledge that [facility] will retain the ownership rights to these photographs, videotapes, digital, or other images, and that I will be allowed access to view or obtain copies of any photographs, videotapes, digital, or other images created as part of the documentation of my care.  I understand that these images will be stored in a secure manner that will protect my privacy and that they will be kept for the time period required by law or by policy at [facility]. Images that identify me will be released and/or used outside the institution only upon written authorization from me or my legal representative ([place], 2001).                                                                                                                                Page 2 of 3                    CONSENT AND ACKNOWLEDGEMENT FORM CONTINUED     [place] IMMUNIZATION NETWORK (LINKS) PARTICIPATION:  I acknowledge that I have been informed about [place] Immunization Network, or LINKS.  I understand that it is a means to keep track of my immunization records for myself, doctors offices, hospitals and other health care providers through secure, electronic means.      INSURANCE NETWORK ACKNOWLEDGEMENT:                                                                            I acknowledge that I have received notice, based on the information available at this time, regarding the status of my insurance plan as in or out of network at [facility]. I understand that a full listing of accepted insurance plans can be found at the [facility] website.     NOTICE     HEALTH CARE SERVICES MAY BE PROVIDED TO YOU AT A NETWORK HEALTH CARE FACILITY BY FACILITY-BASED PHYSICIANS WHO ARE NOT IN YOUR HEALTH PLAN.  YOU MAY BE RESPONSIBLE FOR PAYMENT OF ALL OR PART OF THE FEES FOR THOSE OUT-OF-NETWORK SERVICES, IN ADDITION TO APPLICABLE AMOUNTS DUE FOR CO-PAYMENTS, COINSURANCE, DEDUCTIBLES, AND NON-COVERED SERVICES.  SPECIFIC INFORMATION ABOUT IN-NETWORK AND OUT-OF NETWORK FACILITY-BASED PHYSICIANS CAN BE FOUND AT THE WEBSITE ADDRESS OF YOUR HEALTH PLAN OR BY CALLING THE [employer] TELEPHONE NUMBER OF YOUR HEALTH PLAN.        I/WE HAVE READ, UNDERSTAND AND AGREE TO THE ABOVE.        _______________________________   Patient/Legal Guardian Signature     This signature was collected at 09/15/2022     Time (if no electronic signature):____________            _______________________________   Printed Name/Relationship to Patient       Witness  Sign Here  _______________________________   Witness Signature     This signature was collected at 09/15/2022        _______________________________   Printed Name         Page 3 of 3

## 2023-07-31 NOTE — LETTER
AUTHORIZATION FOR RELEASE OF   CONFIDENTIAL INFORMATION    Dear [facility],    We are seeing Demetrice Palmer, date of birth 1970, in the clinic at [address]. Angel Kennedy MD is the patient's PCP. Demetrice Palmer has an outstanding lab/procedure at the time we reviewed her chart. In order to help keep her health information updated, she has authorized us to request the following medical record(s):                                           ( X )  EYE EXAM ( Most Recent )            Please fax records to [facility], Chequita S Williams, [place], [phone]     If you have any questions, please contact     [facility]  Clinical Care Coordinator    [facility] / [facility]  [phone] (Phone)  [phone] (Fax)       Patient Name: Demetrice Palmer  : 1970  Patient Phone #: [phone]             CONSENT AND ACKNOWLEDGEMENT         FORM       Demetrice Palmer  MRN: [id number]  : 1970  Age: 52 y.o.  Sex: female       MEDICARE-PATIENTS CERTIFICATION, AUTHORIZATION TO RELEASE INFORMATION AND PAYMENT REQUEST:  I certify that the information given by me in applying under the Title XVII of Social Security Act is correct.  I authorize any chen of medical or other information about me to release to the Social Security Administration or its intermediaries or carriers any information needed for this or a related Medicare claim.  I request that payment of authorized benefits be made on my behalf to [facility] and [facility] ([facility]).  I also acknowledge upon admission, that I received the Important Message from Medicare.     AUTHORIZATION TO PAY INSURANCE BENEFITS:  For and in consideration of medical services rendered to the patient named herein, I hereby assign and transfer to [facility], including but not limited to hospital based physicians, attending physicians, consulting  physicians, nurse practitioners and physicians assistants the rights for the payment of medical benefits which I may have under the policy/policies identified by me during registration or any policy which may be determined hereafter to pay benefits otherwise payable to me or to a beneficiary designated in the policy.  By this assignment, I authorize payment directly to [facility], hospital based physicians, attending physicians and consulting physicians of all medical benefits payable under the aforesaid policy/policies, but not to exceed the hospitals and/or clinic regular charges.     GUARANTEE OF ACCOUNT:  I/We certify that the information given is true and correct to the best of my/our knowledge.  I/We understand that bills are payable within thirty (30) days of the date of service.  If it becomes necessary for the account to be referred to an  or collection agency, the undersigned agrees to pay the reasonable s fees or collection expenses. I/We david permission and consent to [facility], our assignees, and third party collection agents to contact myself/us by any telephone number associated with myself/us, including wireless numbers and to leave answering machine and voicemail messages and include in any such messages, information required by law (including debt collection laws) and/or messages regarding amounts owed; to send text messages or emails using any email addresses I/we provided; to use pre-recorded/artificial voice messages  and/or an automatic dialing device in connection with any communications.   I/We agree to be responsible for the payment of all charges of this medical service and hospital based physicians, attending physicians and consulting physicians services rendered to the above named patient     COMMUNICATION AUTHORIZATION:   I hereby authorize Marybeth Granda, to contact me on my cell phone and/or home phone using prerecorded messages, artificial voice  messages, automatic telephone dialing devices or other computer assisted technology, or by electronic mail, text messaging, or by any other form of electronic communication. This includes, but is not limited to, appointment reminders, yearly physical exam reminders, preventive care reminders, patient campaigns and welcome calls. I understand I have the right to opt out of these communications at any time.        Page 1 of 3                 CONSENT AND ACKNOWLEDGEMENT FORM CONTINUED     AUTHORIZATION TO RELEASE INFORMATION:  I hereby authorize [facility] and hospital based physicians to release the information for this occasion of service requested by my insurance company or third party payor for the purpose of obtaining payment for services rendered during this admission and/or to other healthcare providers for the purpose of follow-up care or evaluation of care.  This information may or may not include mental health and/or substance abuse information.     AUTHORIZATION FOR MEDICAL AND/OR SURGICAL TREATMENT:  I hereby authorize [facility] and its employees or agents to provide hospital care incident to this admission, including without limitations, consent to routine diagnostic procedures and medical treatment, which is to include whatever procedures that are deemed necessary by the admitting doctor and such other physicians or assistants as he may designate.     PERSONAL VALUABLES:      It is understood and agreed that the hospital maintains a safe for the safekeeping of money and valuables and the hospital shall not be liable for the loss of damage to any money, jewelry, glasses, documents, dentures, hearing aids or other articles of unusual value, unless placed therein, and shall not be liable for loss or damage to any other personal property, unless deposited with the hospital for safekeeping.  VALUABLES ARE NOT TO BE LEFT IN THE PATIENTS ROOM.     ADVANCE DIRECTIVES:  I understand that I am not  required to have Advance Directives in order to be treated.  I have received written information about my rights to formulate Advance Directives.     NOTICE OF PRIVACY PRACTICES/PATIENT RIGHTS/ADMISSION PACKET:  I acknowledge that I have received copies of the [facility] Notice of Privacy Practices, Patient Rights, and the Admission packet, which contains Smoking Cessation information. I understand that weapons, illegal drugs, or any other items considered  contraband, are not allowed on the [facility] campus, and that I do not have such items in my possession.        CONSENT TO PHOTOGRAPH AND/OR VIDEO TAPE DOCUMENTATION OF CARE:  I understand that photographs, videotapes, digital, or other images may be recorded to document my care.  I acknowledge that [place] will retain the ownership rights to these photographs, videotapes, digital, or other images, and that I will be allowed access to view or obtain copies of any photographs, videotapes, digital, or other images created as part of the documentation of my care.  I understand that these images will be stored in a secure manner that will protect my privacy and that they will be kept for the time period required by law or by policy at [place]. Images that identify me will be released and/or used outside the institution only upon written authorization from me or my legal representative ([place], 2001).                                                                                                                                Page 2 of 3                    CONSENT AND ACKNOWLEDGEMENT FORM CONTINUED     [place] IMMUNIZATION NETWORK (LINKS) PARTICIPATION:  I acknowledge that I have been informed about [place] Immunization Network, or LINKS.  I understand that it is a means to keep track of my immunization records for myself, doctors offices, hospitals and other health care providers through secure, electronic means.     INSURANCE NETWORK ACKNOWLEDGEMENT:                                                                             I acknowledge that I have received notice, based on the information available at this time, regarding the status of my insurance plan as in or out of network at [facility]. I understand that a full listing of accepted insurance plans can be found at the [facility] website.     NOTICE     HEALTH CARE SERVICES MAY BE PROVIDED TO YOU AT A NETWORK HEALTH CARE FACILITY BY FACILITY-BASED PHYSICIANS WHO ARE NOT IN YOUR HEALTH PLAN.  YOU MAY BE RESPONSIBLE FOR PAYMENT OF ALL OR PART OF THE FEES FOR THOSE OUT-OF-NETWORK SERVICES, IN ADDITION TO APPLICABLE AMOUNTS DUE FOR CO-PAYMENTS, COINSURANCE, DEDUCTIBLES, AND NON-COVERED SERVICES.  SPECIFIC INFORMATION ABOUT IN-NETWORK AND OUT-OF NETWORK FACILITY-BASED PHYSICIANS CAN BE FOUND AT THE WEBSITE ADDRESS OF YOUR HEALTH PLAN OR BY CALLING THE [employer] TELEPHONE NUMBER OF YOUR HEALTH PLAN.        I/WE HAVE READ, UNDERSTAND AND AGREE TO THE ABOVE.        _______________________________   Patient/Legal Guardian Signature     This signature was collected at 09/15/2022     Time (if no electronic signature):____________            _______________________________   Printed Name/Relationship to Patient       Witness  Sign Here  _______________________________   Witness Signature     This signature was collected at 09/15/2022        _______________________________   Printed Name         Page 3 of 3

## 2023-09-07 DIAGNOSIS — F33.0 MAJOR DEPRESSIVE DISORDER, RECURRENT, MILD: ICD-10-CM

## 2023-09-07 DIAGNOSIS — E11.29 TYPE 2 DIABETES MELLITUS WITH OTHER DIABETIC KIDNEY COMPLICATION: ICD-10-CM

## 2023-09-07 RX ORDER — BACLOFEN 10 MG/1
30 TABLET ORAL EVERY MORNING
Qty: 270 TABLET | Refills: 1 | Status: CANCELLED | OUTPATIENT
Start: 2023-09-07

## 2023-09-07 RX ORDER — TIRZEPATIDE 5 MG/.5ML
5 INJECTION, SOLUTION SUBCUTANEOUS
Qty: 12 PEN | Refills: 1 | Status: SHIPPED | OUTPATIENT
Start: 2023-09-07

## 2023-09-07 NOTE — TELEPHONE ENCOUNTER
Spoke with Sara at [employer], she wanted a verbal approval of medications refills for patient. Informed I would send to provider for approval if appropriate.  Per Sara, patient needs a 90 day supply with 3 refills of Baclofen and Mounjaro or as prescribed.      Pended to Mariaa COOPER:  Baclofen 10 mg 3 tablets in morning  Mounjaro 5 mg inj every 7 days into skin    Last OV 03/07/2023  Next OV none    Last refill for Mounjaro 09/01/2023 to [facility]    Refills per provider discretion    [employer] tel# [phone]

## 2023-09-07 NOTE — TELEPHONE ENCOUNTER
I have not seen this patient. Looks like last visit was in March With Dr. Kennedy. Dr. Kennedy prescribed the baclofen with dx code for depression. I am not aware of the use of baclofen for depression. I will refill her Mounjaro. Can ask Dr. Vargas if she is aware of patient, and/or use of baclofen for this diagnosis. Otherwise I think patient needs an appt. Thanks.

## 2023-09-21 DIAGNOSIS — F33.0 MAJOR DEPRESSIVE DISORDER, RECURRENT, MILD: ICD-10-CM

## 2023-09-21 RX ORDER — BACLOFEN 10 MG/1
30 TABLET ORAL EVERY MORNING
Qty: 45 TABLET | Refills: 0 | Status: SHIPPED | OUTPATIENT
Start: 2023-09-21 | End: 2024-02-12

## 2023-09-21 NOTE — TELEPHONE ENCOUNTER
----- Message from Danae Paz LPN sent at 9/21/2023  2:45 PM CDT -----    ----- Message -----  From: Tala Atwood  Sent: 9/21/2023   2:19 PM CDT  To: Gavin Ordaz Staff    Patient called and stated that she is returning the nurse santi back please give her a call back at [phone]

## 2023-09-21 NOTE — TELEPHONE ENCOUNTER
The patient's prescription has been approved and sent to [facility]/pharmacy #[id number] - [place] - [address]  [address]  [place] [address]  Phone: [phone] Fax: [phone]

## 2023-09-21 NOTE — TELEPHONE ENCOUNTER
Spoke with patient she states Baclofen is used as a booster for Wellbutrin. Patient takes Wellbutrin and Baclofen together.     Will like medication sent to [facility] in [place].     See encounter from 09/07/2023    Pended to Dr. Vargas-Jimmy

## 2023-09-25 DIAGNOSIS — E11.29 TYPE 2 DIABETES MELLITUS WITH OTHER DIABETIC KIDNEY COMPLICATION: ICD-10-CM

## 2023-09-25 RX ORDER — PEN NEEDLE, DIABETIC 30 GX3/16"
1 NEEDLE, DISPOSABLE MISCELLANEOUS DAILY
Qty: 100 EACH | Refills: 3 | Status: SHIPPED | OUTPATIENT
Start: 2023-09-25

## 2023-09-25 NOTE — TELEPHONE ENCOUNTER
----- Message from Tala Atwood sent at 9/25/2023  7:30 AM CDT -----  VM 09/22/23 1:41 PM : [facility] called and stated that the patient need a refill of her pen needles if any questions please give them a call at [phone]

## 2023-09-25 NOTE — TELEPHONE ENCOUNTER
The patient's prescription has been approved and sent to   [facility] - [place], [place] - [address] [place] [id number]  Phone: [phone] Fax: [phone]

## 2023-10-13 DIAGNOSIS — F33.0 MAJOR DEPRESSIVE DISORDER, RECURRENT, MILD: ICD-10-CM

## 2023-10-13 RX ORDER — BACLOFEN 10 MG/1
30 TABLET ORAL EVERY MORNING
Qty: 270 TABLET | Refills: 1 | Status: SHIPPED | OUTPATIENT
Start: 2023-10-13 | End: 2024-02-12 | Stop reason: SDUPTHER

## 2023-11-30 ENCOUNTER — TELEPHONE (OUTPATIENT)
Dept: FAMILY MEDICINE | Facility: CLINIC | Age: 53
End: 2023-11-30

## 2023-11-30 NOTE — TELEPHONE ENCOUNTER
----- Message from Sushma Anderson sent at 11/30/2023  2:43 PM CST -----  - 1:32-yuli with [facility] is calling to get her mammogram ordered fax as she has an appt tomorrow   [phone] phone   [phone] fax

## 2024-02-08 ENCOUNTER — TELEPHONE (OUTPATIENT)
Dept: FAMILY MEDICINE | Facility: CLINIC | Age: 54
End: 2024-02-08
Payer: COMMERCIAL

## 2024-02-08 NOTE — TELEPHONE ENCOUNTER
----- Message from Junie Carroll sent at 2/7/2024  3:53 PM CST -----  Contact: [phone] Patient  Pt is asking for lab work before her appointment on 02/12/2024. Pt is asking if lab work can be put in for 02/09/2024. Please call pt. This is a portal message I am working. Thank you

## 2024-02-12 ENCOUNTER — OFFICE VISIT (OUTPATIENT)
Dept: OBSTETRICS AND GYNECOLOGY | Facility: CLINIC | Age: 54
End: 2024-02-12
Payer: COMMERCIAL

## 2024-02-12 ENCOUNTER — OFFICE VISIT (OUTPATIENT)
Dept: FAMILY MEDICINE | Facility: CLINIC | Age: 54
End: 2024-02-12
Payer: COMMERCIAL

## 2024-02-12 VITALS
HEIGHT: 65 IN | BODY MASS INDEX: 42.57 KG/M2 | DIASTOLIC BLOOD PRESSURE: 68 MMHG | HEART RATE: 80 BPM | WEIGHT: 255.5 LBS | SYSTOLIC BLOOD PRESSURE: 128 MMHG

## 2024-02-12 VITALS
WEIGHT: 255.75 LBS | SYSTOLIC BLOOD PRESSURE: 126 MMHG | BODY MASS INDEX: 42.56 KG/M2 | DIASTOLIC BLOOD PRESSURE: 76 MMHG

## 2024-02-12 DIAGNOSIS — Z11.4 ENCOUNTER FOR SCREENING FOR HUMAN IMMUNODEFICIENCY VIRUS (HIV): ICD-10-CM

## 2024-02-12 DIAGNOSIS — E27.8 ADRENAL MASS: ICD-10-CM

## 2024-02-12 DIAGNOSIS — F33.0 MAJOR DEPRESSIVE DISORDER, RECURRENT, MILD: ICD-10-CM

## 2024-02-12 DIAGNOSIS — E03.9 ACQUIRED HYPOTHYROIDISM: Chronic | ICD-10-CM

## 2024-02-12 DIAGNOSIS — Z87.42 HISTORY OF ABNORMAL CERVICAL PAP SMEAR: ICD-10-CM

## 2024-02-12 DIAGNOSIS — L65.8 FEMALE PATTERN HAIR LOSS: Chronic | ICD-10-CM

## 2024-02-12 DIAGNOSIS — E11.42 TYPE 2 DIABETES MELLITUS WITH DIABETIC POLYNEUROPATHY, WITH LONG-TERM CURRENT USE OF INSULIN: Primary | Chronic | ICD-10-CM

## 2024-02-12 DIAGNOSIS — Z01.419 ENCOUNTER FOR ANNUAL ROUTINE GYNECOLOGICAL EXAMINATION: Primary | ICD-10-CM

## 2024-02-12 DIAGNOSIS — Z79.4 LONG TERM CURRENT USE OF INSULIN: Chronic | ICD-10-CM

## 2024-02-12 DIAGNOSIS — L68.0 HIRSUTISM: Chronic | ICD-10-CM

## 2024-02-12 DIAGNOSIS — G62.9 PERIPHERAL POLYNEUROPATHY: ICD-10-CM

## 2024-02-12 DIAGNOSIS — E27.8 OTHER SPECIFIED DISORDERS OF ADRENAL GLAND: ICD-10-CM

## 2024-02-12 DIAGNOSIS — Z12.31 ENCOUNTER FOR SCREENING MAMMOGRAM FOR BREAST CANCER: ICD-10-CM

## 2024-02-12 DIAGNOSIS — Z13.220 ENCOUNTER FOR SCREENING FOR LIPID DISORDER: ICD-10-CM

## 2024-02-12 DIAGNOSIS — Z00.00 PREVENTATIVE HEALTH CARE: ICD-10-CM

## 2024-02-12 DIAGNOSIS — Z79.4 TYPE 2 DIABETES MELLITUS WITH DIABETIC POLYNEUROPATHY, WITH LONG-TERM CURRENT USE OF INSULIN: Primary | Chronic | ICD-10-CM

## 2024-02-12 DIAGNOSIS — I10 HYPERTENSION, ESSENTIAL: Chronic | ICD-10-CM

## 2024-02-12 DIAGNOSIS — Z11.59 ENCOUNTER FOR HEPATITIS C SCREENING TEST FOR LOW RISK PATIENT: ICD-10-CM

## 2024-02-12 PROBLEM — E11.29 TYPE 2 DIABETES MELLITUS WITH MICROALBUMINURIA, WITH LONG-TERM CURRENT USE OF INSULIN: Chronic | Status: ACTIVE | Noted: 2022-08-28

## 2024-02-12 PROBLEM — E11.29 TYPE 2 DIABETES MELLITUS WITH OTHER DIABETIC KIDNEY COMPLICATION: Status: RESOLVED | Noted: 2018-03-29 | Resolved: 2024-02-12

## 2024-02-12 PROBLEM — R22.32 MASS OF LEFT HAND: Status: RESOLVED | Noted: 2022-08-28 | Resolved: 2024-02-12

## 2024-02-12 PROBLEM — F32.A DEPRESSION: Status: RESOLVED | Noted: 2022-08-28 | Resolved: 2024-02-12

## 2024-02-12 PROBLEM — E11.29 TYPE 2 DIABETES MELLITUS WITH KIDNEY COMPLICATION, WITH LONG-TERM CURRENT USE OF INSULIN: Status: ACTIVE | Noted: 2022-08-28

## 2024-02-12 PROBLEM — J30.2 OTHER SEASONAL ALLERGIC RHINITIS: Chronic | Status: ACTIVE | Noted: 2017-10-11

## 2024-02-12 PROBLEM — E11.9 TYPE 2 DIABETES MELLITUS WITHOUT COMPLICATION, WITH LONG-TERM CURRENT USE OF INSULIN: Chronic | Status: ACTIVE | Noted: 2022-08-28

## 2024-02-12 PROBLEM — B00.9 HSV-1 (HERPES SIMPLEX VIRUS 1) INFECTION: Chronic | Status: ACTIVE | Noted: 2017-10-11

## 2024-02-12 PROBLEM — E66.01 OBESITIES, MORBID: Status: RESOLVED | Noted: 2017-10-11 | Resolved: 2024-02-12

## 2024-02-12 PROBLEM — L03.012 CELLULITIS OF FINGER OF LEFT HAND: Status: RESOLVED | Noted: 2022-08-28 | Resolved: 2024-02-12

## 2024-02-12 PROBLEM — R80.9 TYPE 2 DIABETES MELLITUS WITH MICROALBUMINURIA, WITH LONG-TERM CURRENT USE OF INSULIN: Chronic | Status: ACTIVE | Noted: 2022-08-28

## 2024-02-12 PROCEDURE — 99999 PR PBB SHADOW E&M-EST. PATIENT-LVL V: CPT | Mod: PBBFAC,,, | Performed by: STUDENT IN AN ORGANIZED HEALTH CARE EDUCATION/TRAINING PROGRAM

## 2024-02-12 PROCEDURE — 3078F DIAST BP <80 MM HG: CPT | Mod: CPTII,S$GLB,, | Performed by: STUDENT IN AN ORGANIZED HEALTH CARE EDUCATION/TRAINING PROGRAM

## 2024-02-12 PROCEDURE — 99396 PREV VISIT EST AGE 40-64: CPT | Mod: 25,S$GLB,, | Performed by: STUDENT IN AN ORGANIZED HEALTH CARE EDUCATION/TRAINING PROGRAM

## 2024-02-12 PROCEDURE — 1159F MED LIST DOCD IN RCRD: CPT | Mod: CPTII,S$GLB,, | Performed by: STUDENT IN AN ORGANIZED HEALTH CARE EDUCATION/TRAINING PROGRAM

## 2024-02-12 PROCEDURE — 1160F RVW MEDS BY RX/DR IN RCRD: CPT | Mod: CPTII,S$GLB,, | Performed by: OBSTETRICS & GYNECOLOGY

## 2024-02-12 PROCEDURE — 3074F SYST BP LT 130 MM HG: CPT | Mod: CPTII,S$GLB,, | Performed by: STUDENT IN AN ORGANIZED HEALTH CARE EDUCATION/TRAINING PROGRAM

## 2024-02-12 PROCEDURE — 99999 PR PBB SHADOW E&M-EST. PATIENT-LVL IV: CPT | Mod: PBBFAC,,, | Performed by: OBSTETRICS & GYNECOLOGY

## 2024-02-12 PROCEDURE — 3074F SYST BP LT 130 MM HG: CPT | Mod: CPTII,S$GLB,, | Performed by: OBSTETRICS & GYNECOLOGY

## 2024-02-12 PROCEDURE — 87624 HPV HI-RISK TYP POOLED RSLT: CPT | Performed by: OBSTETRICS & GYNECOLOGY

## 2024-02-12 PROCEDURE — 3008F BODY MASS INDEX DOCD: CPT | Mod: CPTII,S$GLB,, | Performed by: OBSTETRICS & GYNECOLOGY

## 2024-02-12 PROCEDURE — 88175 CYTOPATH C/V AUTO FLUID REDO: CPT | Performed by: OBSTETRICS & GYNECOLOGY

## 2024-02-12 PROCEDURE — 3008F BODY MASS INDEX DOCD: CPT | Mod: CPTII,S$GLB,, | Performed by: STUDENT IN AN ORGANIZED HEALTH CARE EDUCATION/TRAINING PROGRAM

## 2024-02-12 PROCEDURE — 3078F DIAST BP <80 MM HG: CPT | Mod: CPTII,S$GLB,, | Performed by: OBSTETRICS & GYNECOLOGY

## 2024-02-12 PROCEDURE — 1159F MED LIST DOCD IN RCRD: CPT | Mod: CPTII,S$GLB,, | Performed by: OBSTETRICS & GYNECOLOGY

## 2024-02-12 PROCEDURE — 99386 PREV VISIT NEW AGE 40-64: CPT | Mod: S$GLB,,, | Performed by: OBSTETRICS & GYNECOLOGY

## 2024-02-12 PROCEDURE — 1160F RVW MEDS BY RX/DR IN RCRD: CPT | Mod: CPTII,S$GLB,, | Performed by: STUDENT IN AN ORGANIZED HEALTH CARE EDUCATION/TRAINING PROGRAM

## 2024-02-12 RX ORDER — BACLOFEN 10 MG/1
30 TABLET ORAL EVERY MORNING
Qty: 270 TABLET | Refills: 3 | Status: SHIPPED | OUTPATIENT
Start: 2024-02-12 | End: 2025-02-06

## 2024-02-12 RX ORDER — ATORVASTATIN CALCIUM 10 MG/1
10 TABLET, FILM COATED ORAL DAILY
Qty: 90 TABLET | Refills: 3 | Status: SHIPPED | OUTPATIENT
Start: 2024-02-12 | End: 2025-02-11

## 2024-02-12 RX ORDER — BUPROPION HYDROCHLORIDE 300 MG/1
300 TABLET ORAL DAILY
Qty: 90 TABLET | Refills: 3 | Status: SHIPPED | OUTPATIENT
Start: 2024-02-12

## 2024-02-12 NOTE — PROGRESS NOTES
Plan:     Demetrice was seen today for establish care.    Diagnoses and all orders for this visit:    Type 2 diabetes mellitus with diabetic polyneuropathy, with long-term current use of insulin  -     Hemoglobin A1C; Future  -     Lipid Panel; Future  -     Microalbumin/Creatinine Ratio, Urine; Future  -     atorvastatin (LIPITOR) 10 MG tablet; Take 1 tablet (10 mg total) by mouth once daily.  -     Ambulatory referral/consult to Optometry; Future  -     Ambulatory referral/consult to Podiatry; Future  -     Vitamin B12; Future  -     FOLATE; Future    Long term current use of insulin  -     Hemoglobin A1C; Future  -     Comprehensive Metabolic Panel; Future  -     Microalbumin/Creatinine Ratio, Urine; Future    Peripheral polyneuropathy  -     Comprehensive Metabolic Panel; Future  -     CBC Auto Differential; Future  -     TSH; Future  -     Vitamin B12; Future  -     FOLATE; Future    Major depressive disorder, recurrent, mild  -     TSH; Future  -     buPROPion (WELLBUTRIN XL) 300 MG 24 hr tablet; Take 1 tablet (300 mg total) by mouth once daily.  -     baclofen (LIORESAL) 10 MG tablet; Take 3 tablets (30 mg total) by mouth every morning.    Hirsutism  -     TSH; Future    Female pattern hair loss  -     TSH; Future    Acquired hypothyroidism  -     TSH; Future    Hypertension, essential  -     Comprehensive Metabolic Panel; Future    Preventative health care  Discussed age appropriate preventative healthcare items such as cancer screenings and recommended immunizations. Discussed whether patient is using tobacco, alcohol, or illicit drugs and any concerns were discussed. Discussed maintenance of a healthy weight. Patient queried if she has any additional questions about preventative healthcare and all questions were answered.  -     Hepatitis C Antibody; Future  -     HIV 1/2 Ag/Ab (4th Gen); Future  -     Hemoglobin A1C; Future  -     Lipid Panel; Future  -     Comprehensive Metabolic Panel; Future  -     CBC  Auto Differential; Future  -     Microalbumin/Creatinine Ratio, Urine; Future    Encounter for screening for lipid disorder  -     Lipid Panel; Future    Encounter for hepatitis C screening test for low risk patient  -     Hepatitis C Antibody; Future    Encounter for screening for human immunodeficiency virus (HIV)  -     HIV 1/2 Ag/Ab (4th Gen); Future    Encounter for screening mammogram for breast cancer  -     Mammo Digital Screening Bilat w/ Wesley; Future    Other specified disorders of adrenal gland  -     Comprehensive Metabolic Panel; Future  -     MRI Abdomen W WO Contrast; Future    Adrenal mass  -     Comprehensive Metabolic Panel; Future  -     MRI Abdomen W WO Contrast; Future       Follow up in about 4 weeks (around 3/11/2024), or if symptoms worsen or fail to improve.    Emilia Schulz MD  02/12/2024    Subjective:      Patient ID: Demetrice Palmer is a 53 y.o. female    Chief Complaint   Patient presents with    Establish Care     HPI  53 y.o. female with a PMHx as documented below presents to clinic today for the following:    Pt reports incidental finding of adrenal mass on CT abdomen performed at ED (outside [facility]) - she presents with discharge instructions to follow-up with PCP regarding adrenal mass w/ recommendation of MRI as follow-up imaging. Actual imaging/report not available at time of today's appt.     MDD:   - Wellbutrin  mg daily, baclofen 30 mg daily (booster to Wellbutrin), L-methylfolate     Allergic rhinitis:   - Zyrtec 10 mg daily PRN, Flonase daily PRN    Female pattern hair loss; hirsutism:   - Spironolactone 100 mg daily    HTN:   - Spironolactone 100 mg daily, losartan 100 mg daily, amlodipine 2.5 mg daily     HSV:   - Valtrex PRN (not currently taking)    DMT2 w/ peripheral neuropathy:   - Hgb A1c 7.4% (3/7/23)  - Toujeo 60 units subQ daily  - Jardiance 25 mg daily, Mounjaro 5 mg subQ every 7 days  - Not currently on statin   - ACEi allergy; currently on ARB  -  "Due for diabetic eye exam and foot exam  - Due for labs    Hypothyroidism:   - Most recent TSH wnl (9/2022)  - Synthroid 50 mcg daily     ROS  Constitutional:  Negative for chills and fever.   Respiratory:  Negative for shortness of breath.    Cardiovascular:  Negative for chest pain.   Gastrointestinal:  Negative for abdominal pain, constipation, diarrhea, nausea and vomiting.     Current Outpatient Medications   Medication Instructions    acetaminophen (TYLENOL) 1,300 mg, Oral, As needed (PRN)    amLODIPine (NORVASC) 2.5 mg, Oral, Daily    atorvastatin (LIPITOR) 10 mg, Oral, Daily    baclofen (LIORESAL) 30 mg, Oral, Every morning    buPROPion (WELLBUTRIN XL) 300 mg, Oral, Daily    cetirizine (ZYRTEC) 10 mg, Oral, Daily PRN    diphenhydrAMINE (BENYLIN) 50 mg, Oral, Nightly PRN    fluticasone propionate (FLONASE) 50 mcg/actuation nasal spray 2 sprays, Each Nostril, As needed (PRN)    JARDIANCE 25 mg, Oral, Every morning    levomefolate calcium (ELFOLATE) 15 mg, Oral, Every morning    levothyroxine (SYNTHROID) 50 mcg, Oral, Before breakfast    losartan (COZAAR) 100 mg, Oral, Every morning    MOUNJARO 5 mg, Subcutaneous, Every 7 days    naproxen sodium (ANAPROX) 440 mg, Oral, As needed (PRN)    pen needle, diabetic (BD JENNIFER 2ND GEN PEN NEEDLE) 32 gauge x 5/32" Ndle 1 each, Subcutaneous, Daily    spironolactone (ALDACTONE) 100 mg, Oral, Daily    TOUJEO MAX U-300 SOLOSTAR 60 Units, Subcutaneous, Daily    valACYclovir (VALTREX) 500 mg, Oral, 3 times daily, For 3 days.Then take 1 tablet daily      Past Medical History:   Diagnosis Date    Female pattern hair loss 02/12/2024    Hirsutism 02/12/2024    History of avascular necrosis of capital femoral epiphysis     Type 2 diabetes mellitus with diabetic polyneuropathy, with long-term current use of insulin 08/28/2022    Type 2 diabetes mellitus with microalbuminuria, with long-term current use of insulin 08/28/2022     Past Surgical History:   Procedure Laterality Date    " "COLONOSCOPY N/A 2022    Procedure: COLONOSCOPY;  Surgeon: Justin Hawk MD;  Location: [facility];  Service: Endoscopy;  Laterality: N/A;    HIP ARTHROPLASTY Right 2007    avascular necrosis    TONSILLECTOMY      UVULOPALATOPHARYNGOPLASTY AND SEPTOPLASTY       Review of patient's allergies indicates:   Allergen Reactions    Lisinopril      Other reaction(s): COUGH  Cough  Cough  Cough      Penicillin      Other reaction(s): Unknown     Family History   Problem Relation Age of Onset    Hypertension Father     Diabetes Father     Peripheral vascular disease Father     Prostate cancer Father     Multiple sclerosis Sister      Social History     Tobacco Use    Smoking status: Former     Current packs/day: 0.00     Types: Cigarettes     Quit date: 2010     Years since quittin.1    Smokeless tobacco: Never   Substance Use Topics    Alcohol use: Yes    Drug use: Never     Currently on File with [facility]:   Most Recent Immunizations   Administered Date(s) Administered    COVID-19 Vaccine 2021    COVID-19, MRNA, LN-S, PF (Pfizer) (Gray Cap) 2022    COVID-19, MRNA, LN-S, PF (Pfizer) (Purple Cap) 11/10/2021    COVID-19, mRNA, LNP-S, PF (Moderna )Ages 12+ 10/02/2023    COVID-19, mRNA, LNP-S, bivalent booster, PF (PFIZER OMICRON) 10/01/2022    Influenza 10/10/2022    Influenza (FLUBLOK) - Quadrivalent - Recombinant - PF *Preferred* (egg allergy) 2020    Influenza - Quadrivalent - MDCK 2019    Influenza - Quadrivalent - PF *Preferred* (6 months and older) 10/02/2023    Influenza - Trivalent - Recombinant - PF 2018    Pneumococcal Polysaccharide - 23 Valent 2017       Objective:      Vitals:    24 1306   BP: 128/68   Pulse: 80   Weight: 115.9 kg (255 lb 8.2 oz)   Height: 5' 5" (1.651 m)     Body mass index is 42.52 kg/m².    Physical Exam   Constitutional:       General: No acute distress.  HENT:      Head: Normocephalic and atraumatic.   Pulmonary:      " Effort: Pulmonary effort is normal. No respiratory distress.   Neurological:      General: No focal deficit present.      Mental Status: Alert and oriented to person, place, and time. Mental status is at baseline.    Assessment:       1. Type 2 diabetes mellitus with diabetic polyneuropathy, with long-term current use of insulin    2. Long term current use of insulin    3. Peripheral polyneuropathy    4. Major depressive disorder, recurrent, mild    5. Hirsutism    6. Female pattern hair loss    7. Acquired hypothyroidism    8. Hypertension, essential    9. Preventative health care    10. Encounter for screening for lipid disorder    11. Encounter for hepatitis C screening test for low risk patient    12. Encounter for screening for human immunodeficiency virus (HIV)    13. Encounter for screening mammogram for breast cancer    14. Other specified disorders of adrenal gland    15. Adrenal mass        Emilia Schulz MD  [facility]  Office: [phone]   Fax: [phone]  02/12/2024      Disclaimer: This note was partly generated using dictation software which may occasionally result in transcription errors.    Total time spent on this encounter includes face to face time and non-face to face time preparing to see the patient (eg, review of tests), obtaining and/or reviewing separately obtained history, documenting clinical information in the electronic or other health record, independently interpreting results, and communicating results to the patient/family/caregiver, or care coordinator.

## 2024-02-12 NOTE — PROGRESS NOTES
Chief Complaint   Patient presents with    Well Woman    Groin Swelling     States vaginal area looks swollen    Establish Care       History and Physical:  No LMP recorded. Patient is postmenopausal.       Demetrice Palmer is a 53 y.o. No obstetric history on file. F who presents today for her routine annual GYN exam. The patient has no Gynecology complaints today. Hx abnl pap prev colpo 5 yrs ago.  Menopause 10 yrs      Allergies:   Review of patient's allergies indicates:   Allergen Reactions    Lisinopril      Other reaction(s): COUGH  Cough  Cough  Cough      Penicillin      Other reaction(s): Unknown       Past Medical History:   Diagnosis Date    Female pattern hair loss 02/12/2024    Hirsutism 02/12/2024    History of avascular necrosis of capital femoral epiphysis     Type 2 diabetes mellitus with diabetic polyneuropathy, with long-term current use of insulin 08/28/2022    Type 2 diabetes mellitus with microalbuminuria, with long-term current use of insulin 08/28/2022       Past Surgical History:   Procedure Laterality Date    COLONOSCOPY N/A 12/30/2022    Procedure: COLONOSCOPY;  Surgeon: Justin Hawk MD;  Location: [facility];  Service: Endoscopy;  Laterality: N/A;    HIP ARTHROPLASTY Right 2007    avascular necrosis    TONSILLECTOMY      UVULOPALATOPHARYNGOPLASTY AND SEPTOPLASTY         MEDS:   Current Outpatient Medications on File Prior to Visit   Medication Sig Dispense Refill    acetaminophen (TYLENOL) 650 MG TbSR Take 1,300 mg by mouth as needed (pain).      amLODIPine (NORVASC) 2.5 MG tablet Take 1 tablet (2.5 mg total) by mouth once daily. 90 tablet 3    cetirizine (ZYRTEC) 10 MG tablet Take 10 mg by mouth daily as needed for Allergies.      diphenhydrAMINE (BENYLIN) 12.5 mg/5 mL liquid Take 50 mg by mouth nightly as needed for Insomnia.      fluticasone propionate (FLONASE) 50 mcg/actuation nasal spray 2 sprays by Each Nostril route as needed for Allergies.      insulin glargine U-300  "conc (TOUJEO MAX U-300 SOLOSTAR) 300 unit/mL (3 mL) insulin pen Inject 60 Units into the skin once daily. 2 pen 1    JARDIANCE 25 mg tablet Take 1 tablet (25 mg total) by mouth every morning. 90 tablet 3    levomefolate calcium (ELFOLATE) 15 mg Tab Take 15 mg by mouth every morning.      levothyroxine (SYNTHROID) 50 MCG tablet Take 1 tablet (50 mcg total) by mouth before breakfast. 90 tablet 3    losartan (COZAAR) 100 MG tablet Take 1 tablet (100 mg total) by mouth every morning. 90 tablet 3    naproxen sodium (ANAPROX) 220 MG tablet Take 440 mg by mouth as needed (pain).      pen needle, diabetic (BD JENNIFER 2ND GEN PEN NEEDLE) 32 gauge x 5/32" Ndle Inject 1 each into the skin once daily. 100 each 3    spironolactone (ALDACTONE) 100 MG tablet Take 1 tablet (100 mg total) by mouth once daily. 90 tablet 3    tirzepatide (MOUNJARO) 5 mg/0.5 mL PnIj Inject 5 mg into the skin every 7 days. 12 pen 1    valACYclovir (VALTREX) 500 MG tablet Take 1 tablet (500 mg total) by mouth 3 (three) times daily. For 3 days.Then take 1 tablet daily 90 tablet 1    [DISCONTINUED] baclofen (LIORESAL) 10 MG tablet Take 3 tablets (30 mg total) by mouth every morning. 45 tablet 0    [DISCONTINUED] baclofen (LIORESAL) 10 MG tablet Take 3 tablets (30 mg total) by mouth every morning. 270 tablet 1    [DISCONTINUED] buPROPion (WELLBUTRIN XL) 300 MG 24 hr tablet Take 1 tablet (300 mg total) by mouth once daily. 90 tablet 3    [DISCONTINUED] JARDIANCE 25 mg tablet Take 1 tablet (25 mg total) by mouth every morning. 14 tablet 0    [DISCONTINUED] triamcinolone acetonide 0.1% (KENALOG) 0.1 % ointment Apply topically 2 (two) times daily. (Patient not taking: Reported on 2/12/2024) 453.6 g 1     No current facility-administered medications on file prior to visit.       OB History    No obstetric history on file.         Social History     Socioeconomic History    Marital status: Single   Tobacco Use    Smoking status: Former     Current packs/day: 0.00    "  Types: Cigarettes     Quit date: 2010     Years since quittin.1    Smokeless tobacco: Never   Substance and Sexual Activity    Alcohol use: Yes    Drug use: Never    Sexual activity: Not Currently       Family History   Problem Relation Age of Onset    Hypertension Father     Diabetes Father     Peripheral vascular disease Father     Prostate cancer Father     Multiple sclerosis Sister     Breast cancer Neg Hx     Colon cancer Neg Hx     Ovarian cancer Neg Hx     Uterine cancer Neg Hx     Cervical cancer Neg Hx          Past medical and surgical history reviewed.   I have reviewed the patient's medical history in detail and updated the computerized patient record.        Review of System:   General: no chills, fever, night sweats, weight gain or weight loss  Psychological: no depression or suicidal ideation  Breasts: no new or changing breast lumps, nipple discharge or masses.  Respiratory: no cough, shortness of breath, or wheezing  Cardiovascular: no chest pain or dyspnea on exertion  Gastrointestinal: no abdominal pain, change in bowel habits, or black or bloody stools  Genito-Urinary: no incontinence, urinary frequency/urgency or vulvar/vaginal symptoms, pelvic pain or abnormal vaginal bleeding.  Musculoskeletal: no gait disturbance or muscular weakness      Physical Exam:   /76   Wt 116 kg (255 lb 11.7 oz)   BMI 42.56 kg/m²   Constitutional: She appears alert and responsive. She appears well-developed, well-groomed, and well-nourished. No distress.   HENT:   Head: Normocephalic and atraumatic.   Eyes: Conjunctivae and EOM are normal. No scleral icterus.   Neck: Symmetrical. Normal range of motion. Neck supple. No tracheal deviation present. THYROID:  without masses or tenderness.  Cardiovascular: Normal rate, no rhythm abnormality noted. Extremities without swelling or edema, warm.    Pulmonary/Chest: Normal respiratory Effort. No distress or retractions. She exhibits no  tenderness.  Breasts: are symmetrical.no masses   Right breast exhibits no inverted nipple, no mass, no nipple discharge, no skin change and no tenderness.   Left breast exhibits no inverted nipple, no mass, no nipple discharge, no skin change and no tenderness.  Abdominal: Soft. She exhibits no distension, hernias or masses. There is no tenderness. No enlargement of liver edge or spleen.  There is no rebound and no guarding.   Genitourinary:    External rectal exam shows no thrombosed external hemorrhoids, no lesions.     Pelvic exam was performed with patient supine.   No labial fusion, and symmetrical.    There is no rash, lesion or injury on the right labia.   There is no rash, lesion or injury on the left labia.   No bleeding and no signs of injury around the vaginal introitus, urethral meatus is normal size and without prolapse or lesions, urethra well supported. The cervix is visualized with no discharge, lesions or friability.   No vaginal discharge found.    No significant Cystocele, Enterocele or rectocele, and cervix and uterus well supported.   Bimanual exam:   The urethra is normal to palpation and there are no palpable vaginal wall masses.   Uterus is not deviated, not enlarged, not fixed, normal shape and not tender.   Cervix exhibits no motion tenderness.    Right adnexum displays no mass or nodularity and no tenderness.   Left adnexum displays no mass or nodularity and no tenderness.  Musculoskeletal: Normal range of motion.   Lymphadenopathy: No inguinal adenopathy present.   Neurological: She is alert and oriented to person, place, and time. Coordination normal.   Skin: Skin is warm and dry. She is not diaphoretic. No rashes, lesions or ulcers.   Psychiatric: She has a normal mood and affect, oriented to person, place, and time.      Assessment:   Normal annual GYN exam  1. Encounter for annual routine gynecological examination  Liquid-Based Pap Smear, Screening      2. History of abnormal  cervical Pap smear  Liquid-Based Pap Smear, Screening      Cervix very anteflexed  Obesity  Type 2 DM  Last pap 5 yrs with colpo  Menopause 10yrs  Vulva swelling secondary to weight    Plan:   PAP  Mammogram  Follow up in 6 months pap.  Patient informed will be contacted with results within 2 weeks. Encouraged to please call back or email if she has not heard from us by then.

## 2024-02-17 LAB

## 2024-02-23 ENCOUNTER — PATIENT MESSAGE (OUTPATIENT)
Dept: FAMILY MEDICINE | Facility: CLINIC | Age: 54
End: 2024-02-23
Payer: COMMERCIAL

## 2024-02-23 DIAGNOSIS — E11.29 TYPE 2 DIABETES MELLITUS WITH OTHER DIABETIC KIDNEY COMPLICATION: ICD-10-CM

## 2024-02-23 DIAGNOSIS — E03.9 ACQUIRED HYPOTHYROIDISM: ICD-10-CM

## 2024-02-23 DIAGNOSIS — L68.0 FEMALE HIRSUTISM: ICD-10-CM

## 2024-02-23 DIAGNOSIS — I10 ESSENTIAL HYPERTENSION: ICD-10-CM

## 2024-02-23 RX ORDER — AMLODIPINE BESYLATE 2.5 MG/1
2.5 TABLET ORAL DAILY
Qty: 90 TABLET | Refills: 0 | Status: SHIPPED | OUTPATIENT
Start: 2024-02-23 | End: 2024-05-07

## 2024-02-23 RX ORDER — INSULIN GLARGINE 300 U/ML
60 INJECTION, SOLUTION SUBCUTANEOUS DAILY
Qty: 2 PEN | Refills: 0 | Status: SHIPPED | OUTPATIENT
Start: 2024-02-23 | End: 2024-06-06

## 2024-02-23 RX ORDER — LEVOTHYROXINE SODIUM 50 UG/1
50 TABLET ORAL
Qty: 90 TABLET | Refills: 0 | Status: SHIPPED | OUTPATIENT
Start: 2024-02-23 | End: 2024-05-07

## 2024-02-23 RX ORDER — EMPAGLIFLOZIN 25 MG/1
25 TABLET, FILM COATED ORAL EVERY MORNING
Qty: 90 TABLET | Refills: 0 | Status: SHIPPED | OUTPATIENT
Start: 2024-02-23 | End: 2024-06-06

## 2024-02-23 RX ORDER — LOSARTAN POTASSIUM 100 MG/1
100 TABLET ORAL EVERY MORNING
Qty: 90 TABLET | Refills: 0 | Status: SHIPPED | OUTPATIENT
Start: 2024-02-23 | End: 2024-05-07

## 2024-02-23 NOTE — TELEPHONE ENCOUNTER
Refill Routing Note   Medication(s) are not appropriate for processing by [facility] for the following reason(s):        Outside of protocol: patient requesting in crease in mounjaro dose  No active prescription written by provider: not previously signed by PCP    [facility] action(s):  Defer  Route      Medication Therapy Plan:         Appointments  past 12m or future 3m with PCP    Date Provider   Last Visit   2/12/2024 Emilia Schulz MD   Next Visit   Visit date not found Emilia Schulz MD   ED visits in past 90 days: 0        Note composed:3:19 PM 02/23/2024

## 2024-02-23 NOTE — TELEPHONE ENCOUNTER
No care due was identified.  [facility] Embedded Care Due Messages. Reference number: [id number].   2/23/2024 1:47:55 PM CST

## 2024-02-23 NOTE — TELEPHONE ENCOUNTER
Jazz  pt    LOV 2/12/24    Rx pending, please send to  once completed as pt is wanting to increase mounjaro

## 2024-02-26 RX ORDER — TIRZEPATIDE 7.5 MG/.5ML
7.5 INJECTION, SOLUTION SUBCUTANEOUS
Qty: 4 PEN | Refills: 2 | Status: SHIPPED | OUTPATIENT
Start: 2024-02-26 | End: 2024-04-03 | Stop reason: SDUPTHER

## 2024-02-26 RX ORDER — SPIRONOLACTONE 100 MG/1
100 TABLET, FILM COATED ORAL DAILY
Qty: 90 TABLET | Refills: 3 | Status: SHIPPED | OUTPATIENT
Start: 2024-02-26 | End: 2025-02-25

## 2024-04-03 NOTE — TELEPHONE ENCOUNTER
No care due was identified.  [facility] Embedded Care Due Messages. Reference number: [id number].   4/03/2024 10:55:42 AM CDT

## 2024-04-04 RX ORDER — TIRZEPATIDE 7.5 MG/.5ML
7.5 INJECTION, SOLUTION SUBCUTANEOUS
Qty: 4 PEN | Refills: 2 | Status: SHIPPED | OUTPATIENT
Start: 2024-04-04

## 2024-04-04 NOTE — TELEPHONE ENCOUNTER
Refill Routing Note   Medication(s) are not appropriate for processing by [facility] for the following reason(s):        No active prescription written by provider    [facility] action(s):  Defer               Appointments  past 12m or future 3m with PCP    Date Provider   Last Visit   2/12/2024 Emilia Schulz MD   Next Visit   Visit date not found Emilia Schulz MD   ED visits in past 90 days: 0        Note composed:9:16 AM 04/04/2024

## 2024-05-05 DIAGNOSIS — I10 ESSENTIAL HYPERTENSION: ICD-10-CM

## 2024-05-05 DIAGNOSIS — E03.9 ACQUIRED HYPOTHYROIDISM: ICD-10-CM

## 2024-05-06 NOTE — TELEPHONE ENCOUNTER
Refill Routing Note   Medication(s) are not appropriate for processing by [facility] for the following reason(s):        No active prescription written by provider    [facility] action(s):  Defer               Appointments  past 12m or future 3m with PCP    Date Provider   Last Visit   2/12/2024 Emilia Schulz MD   Next Visit   Visit date not found Emilia Schulz MD   ED visits in past 90 days: 0        Note composed:9:42 AM 05/06/2024

## 2024-05-06 NOTE — TELEPHONE ENCOUNTER
No care due was identified.  [facility] Embedded Care Due Messages. Reference number: [id number].   5/05/2024 11:57:29 PM CDT

## 2024-05-07 RX ORDER — LOSARTAN POTASSIUM 100 MG/1
100 TABLET ORAL EVERY MORNING
Qty: 90 TABLET | Refills: 3 | Status: SHIPPED | OUTPATIENT
Start: 2024-05-07

## 2024-05-07 RX ORDER — LEVOTHYROXINE SODIUM 50 UG/1
50 TABLET ORAL
Qty: 90 TABLET | Refills: 3 | Status: SHIPPED | OUTPATIENT
Start: 2024-05-07

## 2024-05-07 RX ORDER — AMLODIPINE BESYLATE 2.5 MG/1
2.5 TABLET ORAL
Qty: 90 TABLET | Refills: 3 | Status: SHIPPED | OUTPATIENT
Start: 2024-05-07

## 2024-05-09 ENCOUNTER — PATIENT MESSAGE (OUTPATIENT)
Dept: FAMILY MEDICINE | Facility: CLINIC | Age: 54
End: 2024-05-09
Payer: COMMERCIAL

## 2024-06-05 DIAGNOSIS — E11.29 TYPE 2 DIABETES MELLITUS WITH OTHER DIABETIC KIDNEY COMPLICATION: ICD-10-CM

## 2024-06-05 NOTE — TELEPHONE ENCOUNTER
Care Due:                  Date            Visit Type   Department     Provider  --------------------------------------------------------------------------------                                EP -                              PRIMARY      [facility]  Last Visit: 02-      CARE ([facility])   MEDICINE       Emilia Schulz  Next Visit: None Scheduled  None         None Found                                                            Last  Test          Frequency    Reason                     Performed    Due Date  --------------------------------------------------------------------------------    HBA1C.......  6 months...  JARDIANCE, insulin,        02- 08-                             tirzepatide..............    [employer] Embedded Care Due Messages. Reference number: [id number].   6/05/2024 6:08:16 PM CDT

## 2024-06-06 RX ORDER — EMPAGLIFLOZIN 25 MG/1
25 TABLET, FILM COATED ORAL EVERY MORNING
Qty: 90 TABLET | Refills: 3 | Status: SHIPPED | OUTPATIENT
Start: 2024-06-06

## 2024-06-06 RX ORDER — INSULIN GLARGINE 300 U/ML
INJECTION, SOLUTION SUBCUTANEOUS
Qty: 6 ML | Refills: 11 | Status: SHIPPED | OUTPATIENT
Start: 2024-06-06

## 2024-06-06 NOTE — TELEPHONE ENCOUNTER
Refill Routing Note   Medication(s) are not appropriate for processing by [facility] for the following reason(s):        No active prescription written by provider    [facility] action(s):  Defer   Requires labs : Yes             Appointments  past 12m or future 3m with PCP    Date Provider   Last Visit   2/12/2024 Emilia Schulz MD   Next Visit   Visit date not found Emilia Schulz MD   ED visits in past 90 days: 0        Note composed:10:29 PM 06/05/2024

## 2024-06-11 ENCOUNTER — PATIENT MESSAGE (OUTPATIENT)
Dept: ADMINISTRATIVE | Facility: HOSPITAL | Age: 54
End: 2024-06-11
Payer: COMMERCIAL

## 2024-09-02 NOTE — TELEPHONE ENCOUNTER
Care Due:                  Date            Visit Type   Department     Provider  --------------------------------------------------------------------------------                                EP -                              PRIMARY      [facility] FAMILY  Last Visit: 02-      CARE ([facility])   MEDICINE       Emilia Schulz  Next Visit: None Scheduled  None         None Found                                                            Last  Test          Frequency    Reason                     Performed    Due Date  --------------------------------------------------------------------------------    HBA1C.......  6 months...  JARDIANCE, insulin,        02- 08-                             tirzepatide..............    [employer] Embedded Care Due Messages. Reference number: [id number].   9/02/2024 1:57:40 PM CDT

## 2024-09-03 RX ORDER — TIRZEPATIDE 7.5 MG/.5ML
INJECTION, SOLUTION SUBCUTANEOUS
Refills: 0 | OUTPATIENT
Start: 2024-09-03

## 2024-09-03 NOTE — TELEPHONE ENCOUNTER
Refill Decision Note  Quick DC. Inappropriate Request     Demetrice Palmer  is requesting a refill authorization.  Brief Assessment and Rationale for Refill:  Quick Discontinue     Medication Therapy Plan:   Pharmacy is requesting new scripts for the following medications without required information, (sig/ frequency/qty/etc)       Medication Reconciliation Completed: No   Comments:   Pharmacies have been requesting medications for patients without required information, (sig, frequency, qty, etc.). In addition, requests are sent for medication(s) pt. are currently not taking, and medications patients have never taken.    We have spoken to the pharmacies about these request types and advised their teams previously that we are unable to assess these New Script requests and require all details for these requests. This is a known issue and has been reported.     Provider Staff:  Action required for this patient    Requires labs      Please see care gap opportunities below in Care Due Message.    Thanks!  [facility]     Appointments      Date Provider   Last Visit   2/12/2024 Emilia Schulz MD   Next Visit   Visit date not found Emilia Schulz MD          Note composed:2:42 PM 09/03/2024

## 2024-09-10 ENCOUNTER — PATIENT MESSAGE (OUTPATIENT)
Dept: ADMINISTRATIVE | Facility: HOSPITAL | Age: 54
End: 2024-09-10
Payer: COMMERCIAL

## 2024-11-01 NOTE — TELEPHONE ENCOUNTER
Care Due:                  Date            Visit Type   Department     Provider  --------------------------------------------------------------------------------                                EP -                              PRIMARY      [facility] FAMILY  Last Visit: 02-      CARE ([facility])   MEDICINE       Emilia Schulz  Next Visit: None Scheduled  None         None Found                                                            Last  Test          Frequency    Reason                     Performed    Due Date  --------------------------------------------------------------------------------    HBA1C.......  6 months...  JARDIANCE, insulin,        02- 08-                             tirzepatide..............    [employer] Embedded Care Due Messages. Reference number: [id number].   11/01/2024 6:48:43 PM CDT

## 2024-11-02 NOTE — TELEPHONE ENCOUNTER
Refill Routing Note   Medication(s) are not appropriate for processing by [facility] for the following reason(s):        Required labs outdated    [facility] action(s):  Defer     Requires labs : Yes             Appointments  past 12m or future 3m with PCP    Date Provider   Last Visit   2/12/2024 Emilia Schulz MD   Next Visit   Visit date not found Emilia Schulz MD   ED visits in past 90 days: 0        Note composed:4:46 PM 11/02/2024

## 2024-11-03 RX ORDER — TIRZEPATIDE 7.5 MG/.5ML
7.5 INJECTION, SOLUTION SUBCUTANEOUS WEEKLY
Qty: 6 ML | Refills: 3 | Status: SHIPPED | OUTPATIENT
Start: 2024-11-03 | End: 2024-11-04

## 2024-11-04 ENCOUNTER — PATIENT MESSAGE (OUTPATIENT)
Dept: FAMILY MEDICINE | Facility: CLINIC | Age: 54
End: 2024-11-04
Payer: COMMERCIAL

## 2024-11-04 DIAGNOSIS — L21.9 SEBORRHEIC DERMATITIS OF SCALP: ICD-10-CM

## 2024-11-04 DIAGNOSIS — E11.29 TYPE 2 DIABETES MELLITUS WITH OTHER DIABETIC KIDNEY COMPLICATION: Primary | ICD-10-CM

## 2024-11-04 RX ORDER — FLUOCINONIDE CREAM (EMULSIFIED BASE) 0.5 MG/G
CREAM TOPICAL
Qty: 60 G | Refills: 2 | Status: SHIPPED | OUTPATIENT
Start: 2024-11-04

## 2024-11-10 ENCOUNTER — PATIENT MESSAGE (OUTPATIENT)
Dept: ADMINISTRATIVE | Facility: HOSPITAL | Age: 54
End: 2024-11-10
Payer: COMMERCIAL

## 2024-11-12 DIAGNOSIS — E11.9 TYPE 2 DIABETES MELLITUS WITHOUT COMPLICATION, UNSPECIFIED WHETHER LONG TERM INSULIN USE: ICD-10-CM

## 2025-02-13 ENCOUNTER — PATIENT OUTREACH (OUTPATIENT)
Dept: ADMINISTRATIVE | Facility: HOSPITAL | Age: 55
End: 2025-02-13
Payer: COMMERCIAL

## 2025-02-13 DIAGNOSIS — Z12.31 ENCOUNTER FOR SCREENING MAMMOGRAM FOR MALIGNANT NEOPLASM OF BREAST: ICD-10-CM

## 2025-02-13 DIAGNOSIS — E03.9 ACQUIRED HYPOTHYROIDISM: Chronic | ICD-10-CM

## 2025-02-13 DIAGNOSIS — F33.0 MAJOR DEPRESSIVE DISORDER, RECURRENT, MILD: ICD-10-CM

## 2025-02-13 DIAGNOSIS — Z79.4 TYPE 2 DIABETES MELLITUS WITH DIABETIC POLYNEUROPATHY, WITH LONG-TERM CURRENT USE OF INSULIN: Chronic | ICD-10-CM

## 2025-02-13 DIAGNOSIS — E11.42 TYPE 2 DIABETES MELLITUS WITH DIABETIC POLYNEUROPATHY, WITH LONG-TERM CURRENT USE OF INSULIN: Chronic | ICD-10-CM

## 2025-02-13 DIAGNOSIS — E11.9 DIABETES MELLITUS WITHOUT COMPLICATION: Primary | ICD-10-CM

## 2025-02-13 RX ORDER — BUPROPION HYDROCHLORIDE 300 MG/1
300 TABLET ORAL
Qty: 90 TABLET | Refills: 0 | Status: SHIPPED | OUTPATIENT
Start: 2025-02-13

## 2025-02-13 RX ORDER — ATORVASTATIN CALCIUM 10 MG/1
10 TABLET, FILM COATED ORAL
Qty: 90 TABLET | Refills: 0 | Status: SHIPPED | OUTPATIENT
Start: 2025-02-13

## 2025-02-13 NOTE — PROGRESS NOTES
Annual scheduled  Labs ordered  Pt will complete labs this week  New mammo ordered  Pt will do at [facility] at her convenience  Eye exam due added to appt note as pt was driving

## 2025-02-13 NOTE — TELEPHONE ENCOUNTER
Care Due:                  Date            Visit Type   Department     Provider  --------------------------------------------------------------------------------                                EP -                              PRIMARY      [facility] FAMILY  Last Visit: 02-      CARE ([facility])   MEDICINE       Emilia Schulz  Next Visit: None Scheduled  None         None Found                                                            Last  Test          Frequency    Reason                     Performed    Due Date  --------------------------------------------------------------------------------    Office Visit  15 months..  MOHINIDIANCE, amLODIPine,     02- 05-                             atorvastatin, buPROPion,                             insulin, losartan,                             spironolactone,                             valACYclovir.............    CBC.........  12 months..  valACYclovir.............  02- 02-    CMP.........  12 months..  JARDIANCE, atorvastatin,   02- 02-                             insulin, losartan,                             spironolactone,                             valACYclovir.............    HBA1C.......  6 months...  JARDIANCE, insulin.......  02- 08-    Lipid Panel.  12 months..  atorvastatin.............  02- 02-    [facility] Embedded Care Due Messages. Reference number: [id number].   2/13/2025 12:19:30 AM CST

## 2025-02-13 NOTE — TELEPHONE ENCOUNTER
Refill Routing Note   Medication(s) are not appropriate for processing by [facility] for the following reason(s):        Required labs outdated  Required vitals outdated    [facility] action(s):  Defer   Requires appointment : Yes     Requires labs : Yes             Appointments  past 12m or future 3m with PCP    Date Provider   Last Visit   2/12/2024 Emilia Schulz MD   Next Visit   Visit date not found Emilia Schulz MD   ED visits in past 90 days: 0        Note composed:2:36 AM 02/13/2025

## 2025-02-14 DIAGNOSIS — E11.29 TYPE 2 DIABETES MELLITUS WITH OTHER DIABETIC KIDNEY COMPLICATION: ICD-10-CM

## 2025-02-14 RX ORDER — TIRZEPATIDE 10 MG/.5ML
INJECTION, SOLUTION SUBCUTANEOUS
Qty: 2 ML | Refills: 12 | Status: SHIPPED | OUTPATIENT
Start: 2025-02-14

## 2025-02-14 NOTE — TELEPHONE ENCOUNTER
No care due was identified.  [facility] Embedded Care Due Messages. Reference number: [id number].   2/14/2025 5:14:46 AM CST

## 2025-02-14 NOTE — TELEPHONE ENCOUNTER
Refill Routing Note   Medication(s) are not appropriate for processing by [facility] for the following reason(s):        Required labs outdated    [facility] action(s):  Defer               Appointments  past 12m or future 3m with PCP    Date Provider   Last Visit   2/12/2024 Emilia Schulz MD   Next Visit   3/12/2025 Emilia Schulz MD   ED visits in past 90 days: 0        Note composed:10:58 AM 02/14/2025

## 2025-02-17 DIAGNOSIS — L68.0 FEMALE HIRSUTISM: ICD-10-CM

## 2025-02-17 RX ORDER — SPIRONOLACTONE 100 MG/1
100 TABLET, FILM COATED ORAL
Qty: 90 TABLET | Refills: 0 | Status: SHIPPED | OUTPATIENT
Start: 2025-02-17

## 2025-02-17 NOTE — TELEPHONE ENCOUNTER
Refill Routing Note   Medication(s) are not appropriate for processing by [facility] for the following reason(s):        Required vitals outdated  Required labs outdated    [facility] action(s):  Defer               Appointments  past 12m or future 3m with PCP    Date Provider   Last Visit   2/12/2024 Emilia Schulz MD   Next Visit   3/12/2025 Emilia Schulz MD   ED visits in past 90 days: 0        Note composed:1:58 PM 02/17/2025

## 2025-02-17 NOTE — TELEPHONE ENCOUNTER
No care due was identified.  [facility] Embedded Care Due Messages. Reference number: [id number].   2/17/2025 1:05:37 AM CST

## 2025-03-20 ENCOUNTER — LAB VISIT (OUTPATIENT)
Dept: LAB | Facility: HOSPITAL | Age: 55
End: 2025-03-20
Payer: COMMERCIAL

## 2025-03-20 DIAGNOSIS — E11.9 DIABETES MELLITUS WITHOUT COMPLICATION: ICD-10-CM

## 2025-03-20 DIAGNOSIS — E03.9 ACQUIRED HYPOTHYROIDISM: Chronic | ICD-10-CM

## 2025-03-20 LAB
ALBUMIN SERPL BCP-MCNC: 3.9 G/DL (ref 3.5–5.2)
ALBUMIN/CREAT UR: NORMAL UG/MG (ref 0–30)
ALP SERPL-CCNC: 54 U/L (ref 40–150)
ALT SERPL W/O P-5'-P-CCNC: 33 U/L (ref 10–44)
ANION GAP SERPL CALC-SCNC: 12 MMOL/L (ref 8–16)
AST SERPL-CCNC: 34 U/L (ref 10–40)
BILIRUB SERPL-MCNC: 0.5 MG/DL (ref 0.1–1)
BUN SERPL-MCNC: 17 MG/DL (ref 6–20)
CALCIUM SERPL-MCNC: 9.6 MG/DL (ref 8.7–10.5)
CHLORIDE SERPL-SCNC: 105 MMOL/L (ref 95–110)
CHOLEST SERPL-MCNC: 140 MG/DL (ref 120–199)
CHOLEST/HDLC SERPL: 2 {RATIO} (ref 2–5)
CO2 SERPL-SCNC: 22 MMOL/L (ref 23–29)
CREAT SERPL-MCNC: 1 MG/DL (ref 0.5–1.4)
CREAT UR-MCNC: 35 MG/DL (ref 15–325)
EST. GFR  (NO RACE VARIABLE): >60 ML/MIN/1.73 M^2
ESTIMATED AVG GLUCOSE: 105 MG/DL (ref 68–131)
GLUCOSE SERPL-MCNC: 57 MG/DL (ref 70–110)
HBA1C MFR BLD: 5.3 % (ref 4–5.6)
HDLC SERPL-MCNC: 70 MG/DL (ref 40–75)
HDLC SERPL: 50 % (ref 20–50)
LDLC SERPL CALC-MCNC: 59.8 MG/DL (ref 63–159)
MICROALBUMIN UR DL<=1MG/L-MCNC: <5 UG/ML
NONHDLC SERPL-MCNC: 70 MG/DL
POTASSIUM SERPL-SCNC: 4.5 MMOL/L (ref 3.5–5.1)
PROT SERPL-MCNC: 7.3 G/DL (ref 6–8.4)
SODIUM SERPL-SCNC: 139 MMOL/L (ref 136–145)
TRIGL SERPL-MCNC: 51 MG/DL (ref 30–150)
TSH SERPL DL<=0.005 MIU/L-ACNC: 2.6 UIU/ML (ref 0.4–4)

## 2025-03-20 PROCEDURE — 83036 HEMOGLOBIN GLYCOSYLATED A1C: CPT | Performed by: STUDENT IN AN ORGANIZED HEALTH CARE EDUCATION/TRAINING PROGRAM

## 2025-03-20 PROCEDURE — 82043 UR ALBUMIN QUANTITATIVE: CPT | Performed by: STUDENT IN AN ORGANIZED HEALTH CARE EDUCATION/TRAINING PROGRAM

## 2025-03-20 PROCEDURE — 80053 COMPREHEN METABOLIC PANEL: CPT | Performed by: STUDENT IN AN ORGANIZED HEALTH CARE EDUCATION/TRAINING PROGRAM

## 2025-03-20 PROCEDURE — 36415 COLL VENOUS BLD VENIPUNCTURE: CPT | Mod: PN | Performed by: STUDENT IN AN ORGANIZED HEALTH CARE EDUCATION/TRAINING PROGRAM

## 2025-03-20 PROCEDURE — 84443 ASSAY THYROID STIM HORMONE: CPT | Performed by: STUDENT IN AN ORGANIZED HEALTH CARE EDUCATION/TRAINING PROGRAM

## 2025-03-20 PROCEDURE — 80061 LIPID PANEL: CPT | Performed by: STUDENT IN AN ORGANIZED HEALTH CARE EDUCATION/TRAINING PROGRAM

## 2025-03-21 ENCOUNTER — RESULTS FOLLOW-UP (OUTPATIENT)
Dept: FAMILY MEDICINE | Facility: CLINIC | Age: 55
End: 2025-03-21

## 2025-03-25 DIAGNOSIS — E11.29 TYPE 2 DIABETES MELLITUS WITH OTHER DIABETIC KIDNEY COMPLICATION: ICD-10-CM

## 2025-03-25 RX ORDER — TIRZEPATIDE 10 MG/.5ML
10 INJECTION, SOLUTION SUBCUTANEOUS WEEKLY
Qty: 6 ML | Refills: 3 | Status: SHIPPED | OUTPATIENT
Start: 2025-03-25 | End: 2026-03-20

## 2025-03-25 NOTE — TELEPHONE ENCOUNTER
----- Message from Hortencia sent at 3/24/2025  2:52 PM CDT -----  Regarding: Pharm auth  Contact: pt  Type:  Pharmacy Calling to Clarify an RXName of Caller:pharmPharmacy Name:[id number] [place] [address] [facility] [address] [facility] [id number] [place] [id number]: [phone] Fax: [phone] Name: Yemibismark do they need to clarify?:need new rx that includes diagnosis. Best Call Back Number:[phone] Information: requesting new rx for monjaro 10 that includes a diagnosis code.

## 2025-03-25 NOTE — TELEPHONE ENCOUNTER
Pharm is calling and requesting new rx for mounjaro 10 that includes a diagnosis code. Please advise

## 2025-03-25 NOTE — TELEPHONE ENCOUNTER
No care due was identified.  [facility] Embedded Care Due Messages. Reference number: [id number].   3/25/2025 10:22:57 AM CDT

## 2025-04-15 ENCOUNTER — OFFICE VISIT (OUTPATIENT)
Dept: FAMILY MEDICINE | Facility: CLINIC | Age: 55
End: 2025-04-15
Payer: COMMERCIAL

## 2025-04-15 VITALS
HEIGHT: 65 IN | DIASTOLIC BLOOD PRESSURE: 72 MMHG | HEART RATE: 75 BPM | OXYGEN SATURATION: 96 % | SYSTOLIC BLOOD PRESSURE: 118 MMHG | WEIGHT: 228.38 LBS | BODY MASS INDEX: 38.05 KG/M2

## 2025-04-15 DIAGNOSIS — Z79.4 TYPE 2 DIABETES MELLITUS WITH DIABETIC POLYNEUROPATHY, WITH LONG-TERM CURRENT USE OF INSULIN: Primary | Chronic | ICD-10-CM

## 2025-04-15 DIAGNOSIS — E03.9 ACQUIRED HYPOTHYROIDISM: ICD-10-CM

## 2025-04-15 DIAGNOSIS — I10 HYPERTENSION, ESSENTIAL: Chronic | ICD-10-CM

## 2025-04-15 DIAGNOSIS — F33.0 MAJOR DEPRESSIVE DISORDER, RECURRENT, MILD: ICD-10-CM

## 2025-04-15 DIAGNOSIS — Z79.4 LONG TERM CURRENT USE OF INSULIN: Chronic | ICD-10-CM

## 2025-04-15 DIAGNOSIS — E11.29 TYPE 2 DIABETES MELLITUS WITH OTHER DIABETIC KIDNEY COMPLICATION: ICD-10-CM

## 2025-04-15 DIAGNOSIS — E11.42 TYPE 2 DIABETES MELLITUS WITH DIABETIC POLYNEUROPATHY, WITH LONG-TERM CURRENT USE OF INSULIN: Primary | Chronic | ICD-10-CM

## 2025-04-15 DIAGNOSIS — E66.01 SEVERE OBESITY: ICD-10-CM

## 2025-04-15 DIAGNOSIS — L68.0 FEMALE HIRSUTISM: ICD-10-CM

## 2025-04-15 PROCEDURE — 3008F BODY MASS INDEX DOCD: CPT | Mod: CPTII,S$GLB,, | Performed by: STUDENT IN AN ORGANIZED HEALTH CARE EDUCATION/TRAINING PROGRAM

## 2025-04-15 PROCEDURE — 3066F NEPHROPATHY DOC TX: CPT | Mod: CPTII,S$GLB,, | Performed by: STUDENT IN AN ORGANIZED HEALTH CARE EDUCATION/TRAINING PROGRAM

## 2025-04-15 PROCEDURE — 99999 PR PBB SHADOW E&M-EST. PATIENT-LVL IV: CPT | Mod: PBBFAC,,, | Performed by: STUDENT IN AN ORGANIZED HEALTH CARE EDUCATION/TRAINING PROGRAM

## 2025-04-15 PROCEDURE — 3061F NEG MICROALBUMINURIA REV: CPT | Mod: CPTII,S$GLB,, | Performed by: STUDENT IN AN ORGANIZED HEALTH CARE EDUCATION/TRAINING PROGRAM

## 2025-04-15 PROCEDURE — 1160F RVW MEDS BY RX/DR IN RCRD: CPT | Mod: CPTII,S$GLB,, | Performed by: STUDENT IN AN ORGANIZED HEALTH CARE EDUCATION/TRAINING PROGRAM

## 2025-04-15 PROCEDURE — 99396 PREV VISIT EST AGE 40-64: CPT | Mod: S$GLB,,, | Performed by: STUDENT IN AN ORGANIZED HEALTH CARE EDUCATION/TRAINING PROGRAM

## 2025-04-15 PROCEDURE — 4010F ACE/ARB THERAPY RXD/TAKEN: CPT | Mod: CPTII,S$GLB,, | Performed by: STUDENT IN AN ORGANIZED HEALTH CARE EDUCATION/TRAINING PROGRAM

## 2025-04-15 PROCEDURE — 3078F DIAST BP <80 MM HG: CPT | Mod: CPTII,S$GLB,, | Performed by: STUDENT IN AN ORGANIZED HEALTH CARE EDUCATION/TRAINING PROGRAM

## 2025-04-15 PROCEDURE — 3074F SYST BP LT 130 MM HG: CPT | Mod: CPTII,S$GLB,, | Performed by: STUDENT IN AN ORGANIZED HEALTH CARE EDUCATION/TRAINING PROGRAM

## 2025-04-15 PROCEDURE — 99214 OFFICE O/P EST MOD 30 MIN: CPT | Mod: 25,S$GLB,, | Performed by: STUDENT IN AN ORGANIZED HEALTH CARE EDUCATION/TRAINING PROGRAM

## 2025-04-15 PROCEDURE — 1159F MED LIST DOCD IN RCRD: CPT | Mod: CPTII,S$GLB,, | Performed by: STUDENT IN AN ORGANIZED HEALTH CARE EDUCATION/TRAINING PROGRAM

## 2025-04-15 PROCEDURE — 3044F HG A1C LEVEL LT 7.0%: CPT | Mod: CPTII,S$GLB,, | Performed by: STUDENT IN AN ORGANIZED HEALTH CARE EDUCATION/TRAINING PROGRAM

## 2025-04-15 RX ORDER — ATORVASTATIN CALCIUM 10 MG/1
10 TABLET, FILM COATED ORAL NIGHTLY
Qty: 90 TABLET | Refills: 3 | Status: SHIPPED | OUTPATIENT
Start: 2025-04-15 | End: 2026-04-10

## 2025-04-15 RX ORDER — SPIRONOLACTONE 100 MG/1
100 TABLET, FILM COATED ORAL DAILY
Qty: 90 TABLET | Refills: 3 | Status: SHIPPED | OUTPATIENT
Start: 2025-04-15 | End: 2026-04-10

## 2025-04-15 RX ORDER — AMLODIPINE BESYLATE 2.5 MG/1
2.5 TABLET ORAL DAILY
Qty: 90 TABLET | Refills: 3 | Status: SHIPPED | OUTPATIENT
Start: 2025-04-15 | End: 2026-04-10

## 2025-04-15 RX ORDER — INSULIN DEGLUDEC 200 U/ML
60 INJECTION, SOLUTION SUBCUTANEOUS DAILY
Qty: 27 ML | Refills: 3 | Status: SHIPPED | OUTPATIENT
Start: 2025-04-15 | End: 2026-04-15

## 2025-04-15 RX ORDER — INSULIN PUMP SYRINGE, 3 ML
EACH MISCELLANEOUS
Qty: 1 EACH | Refills: 0 | Status: SHIPPED | OUTPATIENT
Start: 2025-04-15 | End: 2026-04-15

## 2025-04-15 RX ORDER — BACLOFEN 10 MG/1
30 TABLET ORAL EVERY MORNING
Qty: 270 TABLET | Refills: 3 | Status: SHIPPED | OUTPATIENT
Start: 2025-04-15 | End: 2026-04-10

## 2025-04-15 RX ORDER — LEVOTHYROXINE SODIUM 50 UG/1
50 TABLET ORAL
Qty: 90 TABLET | Refills: 3 | Status: SHIPPED | OUTPATIENT
Start: 2025-04-15 | End: 2026-04-10

## 2025-04-15 RX ORDER — LOSARTAN POTASSIUM 100 MG/1
100 TABLET ORAL EVERY MORNING
Qty: 90 TABLET | Refills: 3 | Status: SHIPPED | OUTPATIENT
Start: 2025-04-15

## 2025-04-15 RX ORDER — PEN NEEDLE, DIABETIC 30 GX3/16"
1 NEEDLE, DISPOSABLE MISCELLANEOUS DAILY
Qty: 100 EACH | Refills: 3 | Status: SHIPPED | OUTPATIENT
Start: 2025-04-15 | End: 2026-04-10

## 2025-04-15 RX ORDER — BUPROPION HYDROCHLORIDE 300 MG/1
300 TABLET ORAL DAILY
Qty: 90 TABLET | Refills: 3 | Status: SHIPPED | OUTPATIENT
Start: 2025-04-15 | End: 2026-04-10

## 2025-04-15 RX ORDER — LANCETS
EACH MISCELLANEOUS
Qty: 200 EACH | Refills: 11 | Status: SHIPPED | OUTPATIENT
Start: 2025-04-15

## 2025-04-15 RX ORDER — TIRZEPATIDE 10 MG/.5ML
10 INJECTION, SOLUTION SUBCUTANEOUS WEEKLY
Qty: 6 ML | Refills: 3 | Status: SHIPPED | OUTPATIENT
Start: 2025-04-15 | End: 2026-04-10

## 2025-04-15 NOTE — PROGRESS NOTES
"Plan:     Demetrice was seen today for annual exam.    Diagnoses and all orders for this visit:    Type 2 diabetes mellitus with diabetic polyneuropathy, with long-term current use of insulin  -     atorvastatin (LIPITOR) 10 MG tablet; Take 1 tablet (10 mg total) by mouth every evening.  -     insulin degludec (TRESIBA FLEXTOUCH U-200) 200 unit/mL (3 mL) insulin pen; Inject 60 Units into the skin once daily.  -     empagliflozin (JARDIANCE) 25 mg tablet; Take 1 tablet (25 mg total) by mouth every morning.  -     pen needle, diabetic (BD JENNIFER 2ND GEN PEN NEEDLE) 32 gauge x 5/32" Ndle; Inject 1 each into the skin once daily.  -     blood-glucose meter kit; To check BG 1 times daily, to use with insurance preferred meter  -     lancets Misc; To check BG 1 times daily, to use with insurance preferred meter  -     blood sugar diagnostic Strp; To check BG 1 times daily, to use with insurance preferred meter  -     Ambulatory referral/consult to Podiatry; Future    Major depressive disorder, recurrent, mild  -     baclofen (LIORESAL) 10 MG tablet; Take 3 tablets (30 mg total) by mouth every morning.  -     buPROPion (WELLBUTRIN XL) 300 MG 24 hr tablet; Take 1 tablet (300 mg total) by mouth once daily.    Type 2 diabetes mellitus with other diabetic kidney complication  -     atorvastatin (LIPITOR) 10 MG tablet; Take 1 tablet (10 mg total) by mouth every evening.  -     insulin degludec (TRESIBA FLEXTOUCH U-200) 200 unit/mL (3 mL) insulin pen; Inject 60 Units into the skin once daily.  -     empagliflozin (JARDIANCE) 25 mg tablet; Take 1 tablet (25 mg total) by mouth every morning.  -     tirzepatide (MOUNJARO) 10 mg/0.5 mL PnIj; Inject 10 mg into the skin once a week.  -     pen needle, diabetic (BD JENNIFER 2ND GEN PEN NEEDLE) 32 gauge x 5/32" Ndle; Inject 1 each into the skin once daily.  -     blood-glucose meter kit; To check BG 1 times daily, to use with insurance preferred meter  -     lancets Misc; To check BG 1 times " "daily, to use with insurance preferred meter  -     blood sugar diagnostic Strp; To check BG 1 times daily, to use with insurance preferred meter  -     Ambulatory referral/consult to Podiatry; Future    Acquired hypothyroidism  -     levothyroxine (SYNTHROID) 50 MCG tablet; Take 1 tablet (50 mcg total) by mouth before breakfast.    Female hirsutism  -     spironolactone (ALDACTONE) 100 MG tablet; Take 1 tablet (100 mg total) by mouth once daily.    Long term current use of insulin  -     insulin degludec (TRESIBA FLEXTOUCH U-200) 200 unit/mL (3 mL) insulin pen; Inject 60 Units into the skin once daily.  -     pen needle, diabetic (BD JENNIFER 2ND GEN PEN NEEDLE) 32 gauge x 5/32" Ndle; Inject 1 each into the skin once daily.  -     blood-glucose meter kit; To check BG 1 times daily, to use with insurance preferred meter  -     lancets Misc; To check BG 1 times daily, to use with insurance preferred meter  -     blood sugar diagnostic Strp; To check BG 1 times daily, to use with insurance preferred meter    Hypertension, essential  -     amLODIPine (NORVASC) 2.5 MG tablet; Take 1 tablet (2.5 mg total) by mouth once daily.  -     losartan (COZAAR) 100 MG tablet; Take 1 tablet (100 mg total) by mouth every morning.  -     spironolactone (ALDACTONE) 100 MG tablet; Take 1 tablet (100 mg total) by mouth once daily.    Severe obesity  -     tirzepatide (MOUNJARO) 10 mg/0.5 mL PnIj; Inject 10 mg into the skin once a week.       Follow up in about 6 months (around 10/15/2025), or if symptoms worsen or fail to improve.    Emilia Schulz MD  04/15/2025    Subjective:      Patient ID: Demetrice Palmer is a 55 y.o. female    Chief Complaint   Patient presents with    Annual Exam     HPI  55 y.o. female with a PMHx as documented below presents to clinic today for the following:    Annual exam, no specific concerns at this time. Needing refills.     Reviewed most recent test results - all questions answered, pt expressed " understanding.    MDD:   - Wellbutrin  mg daily, baclofen 30 mg daily (booster to Wellbutrin), L-methylfolate      Allergic rhinitis:   - Zyrtec 10 mg daily PRN, Flonase daily PRN     Female pattern hair loss; hirsutism:   - Spironolactone 100 mg daily     HTN:   - Spironolactone 100 mg daily, losartan 100 mg daily, amlodipine 2.5 mg daily      HSV:   - Valtrex PRN (not currently taking)     DMT2 w/ peripheral neuropathy:   - Hgb A1c 5.3% (3/20/25)  - Toujeo 60 units subQ daily  - Jardiance 25 mg daily, Mounjaro 10 mg subQ every 7 days  - Lipitor 10 mg qhs  - ACEi allergy; currently on ARB     Hypothyroidism:   - Synthroid 50 mcg daily     ROS  Constitutional:  Negative for chills and fever.   Respiratory:  Negative for shortness of breath.    Cardiovascular:  Negative for chest pain.   Gastrointestinal:  Negative for abdominal pain, constipation, diarrhea, nausea and vomiting.     Current Outpatient Medications   Medication Instructions    acetaminophen (TYLENOL) 1,300 mg, As needed (PRN)    amLODIPine (NORVASC) 2.5 mg, Oral, Daily    atorvastatin (LIPITOR) 10 mg, Oral, Nightly    baclofen (LIORESAL) 30 mg, Oral, Every morning    blood sugar diagnostic Strp To check BG 1 times daily, to use with insurance preferred meter    blood-glucose meter kit To check BG 1 times daily, to use with insurance preferred meter    buPROPion (WELLBUTRIN XL) 300 mg, Oral, Daily    cetirizine (ZYRTEC) 10 mg, Daily PRN    diphenhydrAMINE (BENYLIN) 50 mg, Nightly PRN    empagliflozin (JARDIANCE) 25 mg, Oral, Every morning    fluocinonide-emollient (FLUOCINONIDE-E) 0.05 % Crea Apply to scalp twice daily as needed for flare-ups.    fluticasone propionate (FLONASE) 50 mcg/actuation nasal spray 2 sprays, As needed (PRN)    insulin degludec (TRESIBA FLEXTOUCH U-200) 60 Units, Subcutaneous, Daily    lancets Misc To check BG 1 times daily, to use with insurance preferred meter    levomefolate calcium (ELFOLATE) 15 mg, Every morning     "levothyroxine (SYNTHROID) 50 mcg, Oral, Before breakfast    losartan (COZAAR) 100 mg, Oral, Every morning    MOUNJARO 10 mg, Subcutaneous, Weekly    naproxen sodium (ANAPROX) 440 mg, As needed (PRN)    pen needle, diabetic (BD JENNIFER 2ND GEN PEN NEEDLE) 32 gauge x 5/32" Ndle 1 each, Subcutaneous, Daily    spironolactone (ALDACTONE) 100 mg, Oral, Daily    valACYclovir (VALTREX) 500 mg, Oral, 3 times daily, For 3 days.Then take 1 tablet daily      Past Medical History:   Diagnosis Date    Female pattern hair loss 02/12/2024    Hirsutism 02/12/2024    History of avascular necrosis of capital femoral epiphysis     Type 2 diabetes mellitus with diabetic polyneuropathy, with long-term current use of insulin 08/28/2022    Type 2 diabetes mellitus with microalbuminuria, with long-term current use of insulin 08/28/2022     Past Surgical History:   Procedure Laterality Date    COLONOSCOPY N/A 12/30/2022    Procedure: COLONOSCOPY;  Surgeon: Justin Hawk MD;  Location: [facility];  Service: Endoscopy;  Laterality: N/A;    HIP ARTHROPLASTY Right 2007    avascular necrosis    TONSILLECTOMY      UVULOPALATOPHARYNGOPLASTY AND SEPTOPLASTY       Review of patient's allergies indicates:   Allergen Reactions    Lisinopril      Other reaction(s): COUGH  Cough  Cough  Cough      Penicillin      Other reaction(s): Unknown     Family History   Problem Relation Name Age of Onset    Hypertension Father      Diabetes Father      Peripheral vascular disease Father      Prostate cancer Father      Multiple sclerosis Sister      Breast cancer Maternal Aunt          40s    Colon cancer Neg Hx      Ovarian cancer Neg Hx      Uterine cancer Neg Hx      Cervical cancer Neg Hx       Social History[1]    Currently on File with [facility]:   Most Recent Immunizations   Administered Date(s) Administered    COVID-19 Vaccine 03/18/2021    COVID-19, MRNA, LN-S, PF (Pfizer) (Gray Cap) 04/04/2022    COVID-19, MRNA, LN-S, PF (Pfizer) (Purple Cap) " "11/10/2021    COVID-19, mRNA, LNP-S, PF (Moderna) Ages 12+ 10/02/2023    COVID-19, mRNA, LNP-S, bivalent booster, PF (PFIZER OMICRON) 10/01/2022    Influenza 10/10/2022    Influenza (FLUBLOK) - Quadrivalent - Recombinant - PF *Preferred* (egg allergy) 11/11/2020    Influenza - Quadrivalent - MDCK 12/23/2019    Influenza - Quadrivalent - PF *Preferred* (6 months and older) 10/02/2023    Influenza - Trivalent - Flublok - PF (18 years and older) 09/26/2018    Pneumococcal Polysaccharide - 23 Valent 03/14/2017     Objective:      Vitals:    04/15/25 1116   BP: 118/72   BP Location: Left arm   Patient Position: Sitting   Pulse: 75   SpO2: 96%   Weight: 103.6 kg (228 lb 6.3 oz)   Height: 5' 5" (1.651 m)     Body mass index is 38.01 kg/m².    Physical Exam   Constitutional:       General: No acute distress.  HENT:      Head: Normocephalic and atraumatic.   Pulmonary:      Effort: Pulmonary effort is normal. No respiratory distress.   Neurological:      General: No focal deficit present.      Mental Status: Alert and oriented to person, place, and time. Mental status is at baseline.    Assessment:       1. Type 2 diabetes mellitus with diabetic polyneuropathy, with long-term current use of insulin    2. Major depressive disorder, recurrent, mild    3. Type 2 diabetes mellitus with other diabetic kidney complication    4. Acquired hypothyroidism    5. Female hirsutism    6. Long term current use of insulin    7. Hypertension, essential    8. Severe obesity        Emilia Schulz MD  [facility]  Office: [phone]   Fax: [phone]  04/15/2025      Disclaimer: This note was partly generated using dictation software which may occasionally result in transcription errors.    Total time spent on this encounter includes face to face time and non-face to face time preparing to see the patient (eg, review of tests), obtaining and/or reviewing separately obtained history, documenting clinical " information in the electronic or other health record, independently interpreting results, and communicating results to the patient/family/caregiver, or care coordinator.      Visit today included increased complexity associated with the care of the episodic problem (see above) addressed and managing the longitudinal care of the patient due to the serious and/or complex managed problem(s) (see above).          [1]   Social History  Tobacco Use    Smoking status: Former     Current packs/day: 0.00     Types: Cigarettes     Quit date: 2010     Years since quittin.3    Smokeless tobacco: Never   Substance Use Topics    Alcohol use: Yes    Drug use: Never

## 2025-06-12 ENCOUNTER — OFFICE VISIT (OUTPATIENT)
Dept: PODIATRY | Facility: CLINIC | Age: 55
End: 2025-06-12
Payer: COMMERCIAL

## 2025-06-12 VITALS — BODY MASS INDEX: 38.05 KG/M2 | WEIGHT: 228.38 LBS | HEIGHT: 65 IN

## 2025-06-12 DIAGNOSIS — L60.3 NAIL DYSTROPHY: Primary | ICD-10-CM

## 2025-06-12 DIAGNOSIS — E11.29 TYPE 2 DIABETES MELLITUS WITH OTHER DIABETIC KIDNEY COMPLICATION: ICD-10-CM

## 2025-06-12 DIAGNOSIS — Z79.4 TYPE 2 DIABETES MELLITUS WITH DIABETIC POLYNEUROPATHY, WITH LONG-TERM CURRENT USE OF INSULIN: Chronic | ICD-10-CM

## 2025-06-12 DIAGNOSIS — E11.42 TYPE 2 DIABETES MELLITUS WITH DIABETIC POLYNEUROPATHY, WITH LONG-TERM CURRENT USE OF INSULIN: Chronic | ICD-10-CM

## 2025-06-12 PROCEDURE — 3044F HG A1C LEVEL LT 7.0%: CPT | Mod: CPTII,S$GLB,, | Performed by: PODIATRIST

## 2025-06-12 PROCEDURE — 3008F BODY MASS INDEX DOCD: CPT | Mod: CPTII,S$GLB,, | Performed by: PODIATRIST

## 2025-06-12 PROCEDURE — 99203 OFFICE O/P NEW LOW 30 MIN: CPT | Mod: S$GLB,,, | Performed by: PODIATRIST

## 2025-06-12 PROCEDURE — 4010F ACE/ARB THERAPY RXD/TAKEN: CPT | Mod: CPTII,S$GLB,, | Performed by: PODIATRIST

## 2025-06-12 PROCEDURE — 3066F NEPHROPATHY DOC TX: CPT | Mod: CPTII,S$GLB,, | Performed by: PODIATRIST

## 2025-06-12 PROCEDURE — 3061F NEG MICROALBUMINURIA REV: CPT | Mod: CPTII,S$GLB,, | Performed by: PODIATRIST

## 2025-06-12 PROCEDURE — 1159F MED LIST DOCD IN RCRD: CPT | Mod: CPTII,S$GLB,, | Performed by: PODIATRIST

## 2025-06-12 PROCEDURE — 99999 PR PBB SHADOW E&M-EST. PATIENT-LVL II: CPT | Mod: PBBFAC,,, | Performed by: PODIATRIST

## 2025-06-15 NOTE — PROGRESS NOTES
Subjective:      Patient ID: Demetrice Palmer is a 55 y.o. female.    Chief Complaint: Diabetic Foot Exam and Foot Problem (Numbness on the left outer side of foot.)    Demetrice is a 55 y.o. female wa past medical history of Female pattern hair loss (02/12/2024), Hirsutism (02/12/2024), History of avascular necrosis of capital femoral epiphysis, Type 2 diabetes mellitus with diabetic polyneuropathy, with long-term current use of insulin (08/28/2022), and Type 2 diabetes mellitus with microalbuminuria, with long-term current use of insulin (08/28/2022). Patient relates having changes in multiple toenails of bilateral foot.  Notes having her nails done with gel applied at the last visit.  States that upon removal she noted white discoloration along the distal 1/2 of the nails.  Denies this being painful but is worried about the potential for fungal infection.  Inquires as to how this should be addressed.  Also, notes having some numbness of the Lt. Lateral foot that radiates proximally to the hip.  Denies muscle weakness or falls in association with this issue.  Denies this being associated with overt pain.  Denies any additional pedal complaints.      PCP: Emilia Schulz MD    Date Last Seen by PCP: 5/25    Hemoglobin A1C   Date Value Ref Range Status   03/20/2025 5.3 4.0 - 5.6 % Final     Comment:     ADA Screening Guidelines:  5.7-6.4%  Consistent with prediabetes  >or=6.5%  Consistent with diabetes    High levels of fetal hemoglobin interfere with the HbA1C  assay. Heterozygous hemoglobin variants (HbS, HgC, etc)do  not significantly interfere with this assay.   However, presence of multiple variants may affect accuracy.     02/12/2024 6.1 (H) 4.0 - 5.6 % Final     Comment:     ADA Screening Guidelines:  5.7-6.4%  Consistent with prediabetes  >or=6.5%  Consistent with diabetes    High levels of fetal hemoglobin interfere with the HbA1C  assay. Heterozygous hemoglobin variants (HbS, HgC, etc)do  not  significantly interfere with this assay.   However, presence of multiple variants may affect accuracy.       Hemoglobin A1c   Date Value Ref Range Status   2022 6.5 (H) 4.8 - 5.6 % Final     Comment:              Prediabetes: 5.7 - 6.4           Diabetes: >6.4           Glycemic control for adults with diabetes: <7.0     2021 5.9 (H) 4.8 - 5.6 % Final     Comment:              Prediabetes: 5.7 - 6.4           Diabetes: >6.4           Glycemic control for adults with diabetes: <7.0           Past Medical History:   Diagnosis Date    Female pattern hair loss 2024    Hirsutism 2024    History of avascular necrosis of capital femoral epiphysis     Type 2 diabetes mellitus with diabetic polyneuropathy, with long-term current use of insulin 2022    Type 2 diabetes mellitus with microalbuminuria, with long-term current use of insulin 2022       Past Surgical History:   Procedure Laterality Date    COLONOSCOPY N/A 2022    Procedure: COLONOSCOPY;  Surgeon: Justin Hawk MD;  Location: [facility];  Service: Endoscopy;  Laterality: N/A;    HIP ARTHROPLASTY Right 2007    avascular necrosis    TONSILLECTOMY      UVULOPALATOPHARYNGOPLASTY AND SEPTOPLASTY         Family History   Problem Relation Name Age of Onset    Hypertension Father      Diabetes Father      Peripheral vascular disease Father      Prostate cancer Father      Multiple sclerosis Sister      Breast cancer Maternal Aunt          40s    Colon cancer Neg Hx      Ovarian cancer Neg Hx      Uterine cancer Neg Hx      Cervical cancer Neg Hx         Social History     Socioeconomic History    Marital status: Single   Tobacco Use    Smoking status: Former     Current packs/day: 0.00     Types: Cigarettes     Quit date: 2010     Years since quittin.5    Smokeless tobacco: Never   Substance and Sexual Activity    Alcohol use: Yes    Drug use: Never    Sexual activity: Not Currently     Social Drivers of Health      Financial Resource Strain: Low Risk  (4/15/2025)    Overall Financial Resource Strain (CARDIA)     Difficulty of Paying Living Expenses: Not hard at all   Food Insecurity: No Food Insecurity (4/15/2025)    Hunger Vital Sign     Worried About Running Out of Food in the Last Year: Never true     Ran Out of Food in the Last Year: Never true   Transportation Needs: No Transportation Needs (4/15/2025)    PRAPARE - Transportation     Lack of Transportation (Medical): No     Lack of Transportation (Non-Medical): No   Physical Activity: Inactive (4/15/2025)    Exercise Vital Sign     Days of Exercise per Week: 0 days     Minutes of Exercise per Session: 0 min   Stress: No Stress Concern Present (4/15/2025)    [employer] - Occupational Stress Questionnaire     Feeling of Stress : Only a little   Housing Stability: Low Risk  (4/15/2025)    Housing Stability Vital Sign     Unable to Pay for Housing in the Last Year: No     Number of Times Moved in the Last Year: 0     Homeless in the Last Year: No       Current Medications[1]    Review of patient's allergies indicates:   Allergen Reactions    Lisinopril      Other reaction(s): COUGH  Cough  Cough  Cough      Penicillin      Other reaction(s): Unknown         Review of Systems   Constitutional: Negative for chills and fever.   Skin:  Positive for color change and nail changes.   Musculoskeletal:  Negative for joint swelling, muscle cramps and myalgias.   Gastrointestinal:  Negative for nausea and vomiting.   Neurological:  Negative for numbness and paresthesias.   Psychiatric/Behavioral:  Negative for altered mental status.            Objective:      Physical Exam  Constitutional:       Appearance: Normal appearance. She is not ill-appearing.   Cardiovascular:      Pulses:           Dorsalis pedis pulses are 2+ on the right side and 2+ on the left side.        Posterior tibial pulses are 2+ on the right side and 2+ on the left side.       Comments: CFT is < 3 seconds bilateral.  Pedal hair growth is present bilateral.  No lower extremity edema noted bilateral.  Toes are warm to touch bilateral.    Musculoskeletal:         General: No tenderness or signs of injury.      Right lower leg: No edema.      Left lower leg: No edema.      Comments: Muscle strength 5/5 in all muscle groups bilateral.  No tenderness nor crepitation with ROM of foot/ankle joints bilateral.  No tenderness with palpation of bilateral foot and ankle.      Skin:     Capillary Refill: Capillary refill takes 2 to 3 seconds.      Findings: No bruising, ecchymosis, erythema, signs of injury, laceration, lesion, petechiae, rash or wound.      Comments: Pedal skin has normal turgor, temperature, and texture bilateral.  Toenails x 10 appear dystrophic with multiple nails showing evidence of lysis along the distal 1/2 of the nail plates.  Bilateral hallux toenails are more predominantly dystrophic than the lesser nails.  Proximal clearance noted to the nail bed of all toenails with no evidence of subungual debris.   Examination of the skin reveals no evidence of significant maceration, rashes, open lesions, suspicious appearing nevi or other concerning lesions.       Neurological:      General: No focal deficit present.      Mental Status: She is alert.      Sensory: No sensory deficit.      Motor: No weakness or atrophy.      Comments: Light touch is intact bilateral.  Light touch is intact bilateral.                 Assessment:       Encounter Diagnoses   Name Primary?    Type 2 diabetes mellitus with diabetic polyneuropathy, with long-term current use of insulin     Type 2 diabetes mellitus with other diabetic kidney complication     Nail dystrophy Yes         Plan:       Demetrice was seen today for diabetic foot exam and foot problem.    Diagnoses and all orders for this visit:    Nail dystrophy  -     Specimen to Pathology Other    Type 2 diabetes mellitus with diabetic polyneuropathy,  with long-term current use of insulin  -     Ambulatory referral/consult to Podiatry    Type 2 diabetes mellitus with other diabetic kidney complication  -     Ambulatory referral/consult to Podiatry      I counseled the patient on her conditions, their implications and medical management.    Based on today's exam, I suspect nail changes are secondary to the gel used when she previously had her nails done.    To ensure no fungal infection is present, a sterile nail nipper was used to trim a portion of the Lt. Hallux toenail.  This was sent to Pathology to determine if fungal infection is present.    Regarding numbness along the Lt. Lateral foot, this is directly attributed to sciatica issues and not a function of neuropathy.      Shoe inspection. Diabetic Foot Education. Patient reminded of the importance of good nutrition and blood sugar control to help prevent podiatric complications of diabetes. Patient instructed on proper foot hygeine. We discussed wearing proper shoe gear, daily foot inspections, never walking without protective shoe gear, never putting sharp instruments to feet    Patient instructed to inspect her feet, wear protective shoe gear when ambulatory, moisturizer to maintain skin integrity and follow in this office in approximately 12 months, sooner pvíctor.    Elias Pretty DPM               [1]   Current Outpatient Medications   Medication Sig Dispense Refill    acetaminophen (TYLENOL) 650 MG TbSR Take 1,300 mg by mouth as needed (pain).      amLODIPine (NORVASC) 2.5 MG tablet Take 1 tablet (2.5 mg total) by mouth once daily. 90 tablet 3    atorvastatin (LIPITOR) 10 MG tablet Take 1 tablet (10 mg total) by mouth every evening. 90 tablet 3    baclofen (LIORESAL) 10 MG tablet Take 3 tablets (30 mg total) by mouth every morning. 270 tablet 3    blood sugar diagnostic Strp To check BG 1 times daily, to use with insurance preferred meter 200 each 11    blood-glucose meter kit To check BG 1 times daily,  "to use with insurance preferred meter 1 each 0    buPROPion (WELLBUTRIN XL) 300 MG 24 hr tablet Take 1 tablet (300 mg total) by mouth once daily. 90 tablet 3    cetirizine (ZYRTEC) 10 MG tablet Take 10 mg by mouth daily as needed for Allergies.      diphenhydrAMINE (BENYLIN) 12.5 mg/5 mL liquid Take 50 mg by mouth nightly as needed for Insomnia.      empagliflozin (JARDIANCE) 25 mg tablet Take 1 tablet (25 mg total) by mouth every morning. 90 tablet 3    fluocinonide-emollient (FLUOCINONIDE-E) 0.05 % Crea Apply to scalp twice daily as needed for flare-ups. 60 g 2    fluticasone propionate (FLONASE) 50 mcg/actuation nasal spray 2 sprays by Each Nostril route as needed for Allergies.      insulin degludec (TRESIBA FLEXTOUCH U-200) 200 unit/mL (3 mL) insulin pen Inject 60 Units into the skin once daily. 27 mL 3    lancets Misc To check BG 1 times daily, to use with insurance preferred meter 200 each 11    levomefolate calcium (ELFOLATE) 15 mg Tab Take 15 mg by mouth every morning.      levothyroxine (SYNTHROID) 50 MCG tablet Take 1 tablet (50 mcg total) by mouth before breakfast. 90 tablet 3    losartan (COZAAR) 100 MG tablet Take 1 tablet (100 mg total) by mouth every morning. 90 tablet 3    naproxen sodium (ANAPROX) 220 MG tablet Take 440 mg by mouth as needed (pain).      pen needle, diabetic (BD JENNIFER 2ND GEN PEN NEEDLE) 32 gauge x 5/32" Ndle Inject 1 each into the skin once daily. 100 each 3    spironolactone (ALDACTONE) 100 MG tablet Take 1 tablet (100 mg total) by mouth once daily. 90 tablet 3    tirzepatide (MOUNJARO) 10 mg/0.5 mL PnIj Inject 10 mg into the skin once a week. 6 mL 3    valACYclovir (VALTREX) 500 MG tablet Take 1 tablet (500 mg total) by mouth 3 (three) times daily. For 3 days.Then take 1 tablet daily 90 tablet 1     No current facility-administered medications for this visit.     "

## 2025-06-27 ENCOUNTER — PATIENT MESSAGE (OUTPATIENT)
Dept: PODIATRY | Facility: CLINIC | Age: 55
End: 2025-06-27
Payer: COMMERCIAL

## 2025-08-05 PROBLEM — E86.0 DEHYDRATION WITH HYPONATREMIA: Status: ACTIVE | Noted: 2025-08-05

## 2025-08-05 PROBLEM — E11.9 TYPE 2 DIABETES MELLITUS WITHOUT COMPLICATION, WITH LONG-TERM CURRENT USE OF INSULIN: Status: ACTIVE | Noted: 2017-10-11

## 2025-08-05 PROBLEM — R07.9 CHEST PAIN: Status: ACTIVE | Noted: 2025-08-05

## 2025-08-05 PROBLEM — E87.1 DEHYDRATION WITH HYPONATREMIA: Status: ACTIVE | Noted: 2025-08-05

## 2025-08-05 PROBLEM — E78.5 HYPERLIPIDEMIA: Status: ACTIVE | Noted: 2025-08-05

## 2025-08-05 PROBLEM — M62.82 NON-TRAUMATIC RHABDOMYOLYSIS: Status: ACTIVE | Noted: 2025-08-05

## 2025-08-05 PROBLEM — N17.9 AKI (ACUTE KIDNEY INJURY): Status: ACTIVE | Noted: 2025-08-05

## 2025-08-06 ENCOUNTER — TELEPHONE (OUTPATIENT)
Dept: FAMILY MEDICINE | Facility: CLINIC | Age: 55
End: 2025-08-06
Payer: COMMERCIAL

## 2025-08-06 NOTE — TELEPHONE ENCOUNTER
Pt was discharged from hospital yesterday , dx with rhabdomyolysis .Hospital f/u appt sched 8/7 with Jessica.--lp

## 2025-08-06 NOTE — TELEPHONE ENCOUNTER
Copied from CRM #[id number]. Topic: Appointments - Appointment Scheduling  >> Aug 5, 2025  4:35 PM Lainey wrote:  Type:  Sooner Apoointment Request    Caller is requesting a sooner appointment.  Caller declined first available appointment listed below.  Caller will not accept being placed on the waitlist and is requesting a message be sent to doctor.  Name of Caller:patient  When is the first available appointment?  Symptoms:  Would the patient rather a call back or a response via [facility]? call  Best Call Back Number:[phone]    Additional Information: hospital follow up needs to be seen within 2 days. Please call and advise.

## 2025-08-07 ENCOUNTER — LAB VISIT (OUTPATIENT)
Dept: LAB | Facility: HOSPITAL | Age: 55
End: 2025-08-07
Payer: COMMERCIAL

## 2025-08-07 ENCOUNTER — OFFICE VISIT (OUTPATIENT)
Dept: FAMILY MEDICINE | Facility: CLINIC | Age: 55
End: 2025-08-07
Payer: COMMERCIAL

## 2025-08-07 VITALS
WEIGHT: 232.13 LBS | OXYGEN SATURATION: 99 % | SYSTOLIC BLOOD PRESSURE: 118 MMHG | HEART RATE: 78 BPM | BODY MASS INDEX: 38.63 KG/M2 | DIASTOLIC BLOOD PRESSURE: 84 MMHG

## 2025-08-07 DIAGNOSIS — Z09 HOSPITAL DISCHARGE FOLLOW-UP: Primary | ICD-10-CM

## 2025-08-07 DIAGNOSIS — L68.0 HIRSUTISM: ICD-10-CM

## 2025-08-07 DIAGNOSIS — E86.0 DEHYDRATION WITH HYPONATREMIA: ICD-10-CM

## 2025-08-07 DIAGNOSIS — E11.42 TYPE 2 DIABETES MELLITUS WITH DIABETIC POLYNEUROPATHY, WITH LONG-TERM CURRENT USE OF INSULIN: ICD-10-CM

## 2025-08-07 DIAGNOSIS — I10 HYPERTENSION, ESSENTIAL: ICD-10-CM

## 2025-08-07 DIAGNOSIS — Z79.4 TYPE 2 DIABETES MELLITUS WITH DIABETIC POLYNEUROPATHY, WITH LONG-TERM CURRENT USE OF INSULIN: ICD-10-CM

## 2025-08-07 DIAGNOSIS — E87.1 DEHYDRATION WITH HYPONATREMIA: ICD-10-CM

## 2025-08-07 DIAGNOSIS — E03.9 ACQUIRED HYPOTHYROIDISM: ICD-10-CM

## 2025-08-07 LAB
ALBUMIN SERPL BCP-MCNC: 3.7 G/DL (ref 3.5–5.2)
ALP SERPL-CCNC: 54 UNIT/L (ref 40–150)
ALT SERPL W/O P-5'-P-CCNC: 42 UNIT/L (ref 0–55)
ANION GAP (OHS): 8 MMOL/L (ref 8–16)
AST SERPL-CCNC: 34 UNIT/L (ref 0–50)
BILIRUB SERPL-MCNC: 0.2 MG/DL (ref 0.1–1)
BUN SERPL-MCNC: 12 MG/DL (ref 6–20)
CALCIUM SERPL-MCNC: 8.7 MG/DL (ref 8.7–10.5)
CHLORIDE SERPL-SCNC: 107 MMOL/L (ref 95–110)
CK SERPL-CCNC: 456 U/L (ref 20–180)
CO2 SERPL-SCNC: 23 MMOL/L (ref 23–29)
CREAT SERPL-MCNC: 0.9 MG/DL (ref 0.5–1.4)
GFR SERPLBLD CREATININE-BSD FMLA CKD-EPI: >60 ML/MIN/1.73/M2
GLUCOSE SERPL-MCNC: 72 MG/DL (ref 70–110)
POTASSIUM SERPL-SCNC: 4.2 MMOL/L (ref 3.5–5.1)
PROT SERPL-MCNC: 6.6 GM/DL (ref 6–8.4)
SODIUM SERPL-SCNC: 138 MMOL/L (ref 136–145)

## 2025-08-07 PROCEDURE — 1160F RVW MEDS BY RX/DR IN RCRD: CPT | Mod: CPTII,S$GLB,, | Performed by: NURSE PRACTITIONER

## 2025-08-07 PROCEDURE — 1159F MED LIST DOCD IN RCRD: CPT | Mod: CPTII,S$GLB,, | Performed by: NURSE PRACTITIONER

## 2025-08-07 PROCEDURE — 3044F HG A1C LEVEL LT 7.0%: CPT | Mod: CPTII,S$GLB,, | Performed by: NURSE PRACTITIONER

## 2025-08-07 PROCEDURE — 3079F DIAST BP 80-89 MM HG: CPT | Mod: CPTII,S$GLB,, | Performed by: NURSE PRACTITIONER

## 2025-08-07 PROCEDURE — 80053 COMPREHEN METABOLIC PANEL: CPT

## 2025-08-07 PROCEDURE — 4010F ACE/ARB THERAPY RXD/TAKEN: CPT | Mod: CPTII,S$GLB,, | Performed by: NURSE PRACTITIONER

## 2025-08-07 PROCEDURE — 3074F SYST BP LT 130 MM HG: CPT | Mod: CPTII,S$GLB,, | Performed by: NURSE PRACTITIONER

## 2025-08-07 PROCEDURE — 82550 ASSAY OF CK (CPK): CPT

## 2025-08-07 PROCEDURE — 3066F NEPHROPATHY DOC TX: CPT | Mod: CPTII,S$GLB,, | Performed by: NURSE PRACTITIONER

## 2025-08-07 PROCEDURE — 99214 OFFICE O/P EST MOD 30 MIN: CPT | Mod: S$GLB,,, | Performed by: NURSE PRACTITIONER

## 2025-08-07 PROCEDURE — 3008F BODY MASS INDEX DOCD: CPT | Mod: CPTII,S$GLB,, | Performed by: NURSE PRACTITIONER

## 2025-08-07 PROCEDURE — 3061F NEG MICROALBUMINURIA REV: CPT | Mod: CPTII,S$GLB,, | Performed by: NURSE PRACTITIONER

## 2025-08-07 PROCEDURE — 36415 COLL VENOUS BLD VENIPUNCTURE: CPT | Mod: PN

## 2025-08-07 PROCEDURE — 99999 PR PBB SHADOW E&M-EST. PATIENT-LVL V: CPT | Mod: PBBFAC,,, | Performed by: NURSE PRACTITIONER

## 2025-08-07 NOTE — PATIENT INSTRUCTIONS
Do not hesitate to get in touch with me should you have any further questions.      Thank you for trusting me with your care!  I wish you health and happiness.     NYDIA Melgar

## 2025-08-07 NOTE — PROGRESS NOTES
THIS DOCUMENT WAS MADE IN PART WITH VOICE RECOGNITION SOFTWARE.  OCCASIONALLY THIS SOFTWARE WILL MISINTERPRET WORDS OR PHRASES.     Assessment and Plan:    Hospital discharge follow-up    Dehydration with hyponatremia  -     Comprehensive Metabolic Panel; Future; Expected date: 08/07/2025  -     CK; Future; Expected date: 08/07/2025    Acquired hypothyroidism  Comments:  Controlled  Continue levothyroxine    Hypertension, essential  Comments:  Controlled  Continue regimen-Norvasc, resume losartan    Type 2 diabetes mellitus with diabetic polyneuropathy, with long-term current use of insulin  Comments:  Controlled-A1c 5.3  Continue regimen  Monitor blood sugar    Hirsutism  Comments:  May resume spironolactone             Visit summary:  Hospital course and diagnostic studies reviewed in detail with patient during today's visit.    Patient's condition improved, stable.    Will hold Liptor for now.    Will repeat labs.      Follow up in about 2 months (around 10/7/2025), or if symptoms worsen or fail to improve, for Follow-up with PCP.   ______________________________________________________________________  Subjective:    Chief Complaint:  Hospital follow up    HPI:  Demetrice is a 55 y.o. year old female with a past medical history noted below, here for hospital follow up.  Patient was admitted to [facility] on August 5th. Patient sought emergency care due to cold sweats, nausea, vomiting, lower back pain, and shortness of breath, fearing a potential heart attack. Hospital evaluation revealed severe dehydration with elevated CPK levels, indicative of rhabdomyolysis. These symptoms had been ongoing for a few weeks prior to seeking medical attention, with muscle cramps present for at least one week prior. Patient is uncertain about the cause, considering a possible medication-related effect.   During hospitalization patient was rehydrated with IV fluids. Kidney function was improving, and CK levels, initially elevated around  "1362, were being monitored. Patient requested additional CK testing before discharge, but it was deemed unnecessary due to improved kidney function.    Patient has a history of taking Lipitor (statin) and spironolactone (diuretic), both paused during hospitalization. She expresses a desire to continue spironolactone for beard management, even if it requires additional laser treatments.    After discharge, patient continues to have concerns about hydration. She reports consuming a gallon of water and multiple large containers of Powerade today, yet still feels dehydrated.  She reports symptoms improving, no longer having bodyaches, still slightly fatigued.  She denies chest pain and shortness of breath.        See hospital course below:    "Hospital Course:   Patient admitted for chest pain, rhabdomyolysis, and SUNITA   HS troponin was negative x3   TSH was wnl   Ddimer was wnl   SUNITA improving with IVF   Stress echo negative   She was hypoglycemic while npo and given dextrose. Glucose improved   Patient feeling better and wants to go home.   Hold nephrotoxins as below. See med reconciliation "          Medications:  Medications Ordered Prior to Encounter[1]    Review of Systems:  Review of Systems   Constitutional:  Positive for fatigue. Negative for fever.   Respiratory:  Negative for cough and shortness of breath.    Cardiovascular:  Negative for chest pain.   Gastrointestinal:  Negative for abdominal pain, diarrhea and vomiting.   Endocrine: Positive for polydipsia. Negative for polyphagia and polyuria.       Past Medical History:  Past Medical History:   Diagnosis Date    Female pattern hair loss 02/12/2024    Hirsutism 02/12/2024    History of avascular necrosis of capital femoral epiphysis     Type 2 diabetes mellitus with diabetic polyneuropathy, with long-term current use of insulin 08/28/2022    Type 2 diabetes mellitus with microalbuminuria, with long-term current use of insulin 08/28/2022 "       Objective:    Vitals:  Vitals:    08/07/25 1530   BP: 118/84   Pulse: 78   SpO2: 99%   Weight: 105.3 kg (232 lb 2.3 oz)   PainSc: 0-No pain       Physical Exam  Vitals and nursing note reviewed.   Constitutional:       General: She is not in acute distress.     Appearance: She is obese.   HENT:      Head: Normocephalic and atraumatic.      Mouth/Throat:      Mouth: Mucous membranes are moist.      Pharynx: Oropharynx is clear.   Eyes:      General: No scleral icterus.     Conjunctiva/sclera: Conjunctivae normal.   Cardiovascular:      Rate and Rhythm: Normal rate and regular rhythm.   Pulmonary:      Effort: Pulmonary effort is normal. No respiratory distress.      Breath sounds: Normal breath sounds.   Musculoskeletal:      Right lower leg: No edema.      Left lower leg: No edema.   Skin:     General: Skin is warm and dry.   Neurological:      Mental Status: She is alert and oriented to person, place, and time.   Psychiatric:         Mood and Affect: Mood normal.         Behavior: Behavior normal.         Thought Content: Thought content normal.         Data:  CMP  Sodium   Date Value Ref Range Status   08/05/2025 135 (L) 136 - 145 mmol/L Final     Potassium   Date Value Ref Range Status   08/05/2025 3.6 3.5 - 5.1 mmol/L Final     Comment:     Anion Gap reference range revised on 4/28/2023     Chloride   Date Value Ref Range Status   08/05/2025 108 95 - 110 mmol/L Final     CO2   Date Value Ref Range Status   08/05/2025 21 (L) 23 - 29 mmol/L Final     Glucose   Date Value Ref Range Status   08/05/2025 69 (L) 70 - 110 mg/dL Final     Comment:     The ADA recommends the following guidelines for fasting glucose:    Normal:       less than 100 mg/dL    Prediabetes:  100 mg/dL to 125 mg/dL    Diabetes:     126 mg/dL or higher       BUN   Date Value Ref Range Status   08/05/2025 20 6 - 20 mg/dL Final     Creatinine   Date Value Ref Range Status   08/05/2025 1.22 0.50 - 1.40 mg/dL Final     Calcium   Date Value Ref  Range Status   08/05/2025 8.7 8.7 - 10.5 mg/dL Final     Total Protein   Date Value Ref Range Status   08/05/2025 6.9 6.0 - 8.4 g/dL Final     Albumin   Date Value Ref Range Status   08/05/2025 3.8 3.5 - 5.2 g/dL Final     Total Bilirubin   Date Value Ref Range Status   08/05/2025 0.6 0.2 - 1.0 mg/dL Final     Alkaline Phosphatase   Date Value Ref Range Status   08/05/2025 56 40 - 150 U/L Final     AST   Date Value Ref Range Status   08/05/2025 52 (H) 10 - 40 U/L Final     ALT   Date Value Ref Range Status   08/05/2025 37 10 - 44 U/L Final     Anion Gap   Date Value Ref Range Status   08/05/2025 6 (L) 8 - 16 mmol/L Final     Comment:     Anion Gap reference range revised on 4/28/2023     eGFR   Date Value Ref Range Status   08/05/2025 53 (A) >60 mL/min/1.73 m^2 Final         Lab Results   Component Value Date    HGBA1C 5.3 03/20/2025    HGBA1C 6.1 (H) 02/12/2024    HGBA1C 6.5 (H) 09/13/2022    GYKCHPI9P 7.4 03/07/2023    RWKTGWK8D 6.9 11/10/2020    [id number] 6.9 08/05/2020      Lab Results   Component Value Date    HGBA1C 5.3 03/20/2025    HGBA1C 6.1 (H) 02/12/2024    HGBA1C 6.5 (H) 09/13/2022    XUQANGJ3M 7.4 03/07/2023    RSRYQGG7H 6.9 11/10/2020    NRAFWQA1O 6.9 08/05/2020                Medical history reviewed, Medications reconciled.              ALEXIS Melgar-C  Family Medicine         [1]   Current Outpatient Medications on File Prior to Visit   Medication Sig Dispense Refill    acetaminophen (TYLENOL) 650 MG TbSR Take 1,300 mg by mouth as needed (pain).      amLODIPine (NORVASC) 2.5 MG tablet Take 1 tablet (2.5 mg total) by mouth once daily. 90 tablet 3    baclofen (LIORESAL) 10 MG tablet Take 3 tablets (30 mg total) by mouth every morning. 270 tablet 3    blood sugar diagnostic Strp To check BG 1 times daily, to use with insurance preferred meter 200 each 11    blood-glucose meter kit To check BG 1 times daily, to use with insurance preferred meter 1 each 0    buPROPion (WELLBUTRIN XL) 300 MG 24 hr  "tablet Take 1 tablet (300 mg total) by mouth once daily. 90 tablet 3    cetirizine (ZYRTEC) 10 MG tablet Take 10 mg by mouth daily as needed for Allergies.      diphenhydrAMINE (BENYLIN) 12.5 mg/5 mL liquid Take 50 mg by mouth nightly as needed for Insomnia.      empagliflozin (JARDIANCE) 25 mg tablet Take 1 tablet (25 mg total) by mouth every morning. 90 tablet 3    fluticasone propionate (FLONASE) 50 mcg/actuation nasal spray 2 sprays by Each Nostril route as needed for Allergies.      insulin degludec (TRESIBA FLEXTOUCH U-200) 200 unit/mL (3 mL) insulin pen Inject 60 Units into the skin once daily. 27 mL 3    lancets Misc To check BG 1 times daily, to use with insurance preferred meter 200 each 11    levomefolate calcium (ELFOLATE) 15 mg Tab Take 15 mg by mouth every morning.      levothyroxine (SYNTHROID) 50 MCG tablet Take 1 tablet (50 mcg total) by mouth before breakfast. 90 tablet 3    losartan (COZAAR) 100 MG tablet Take 1 tablet (100 mg total) by mouth every morning. 90 tablet 3    pen needle, diabetic (BD JENNIFER 2ND GEN PEN NEEDLE) 32 gauge x 5/32" Ndle Inject 1 each into the skin once daily. 100 each 3    tirzepatide (MOUNJARO) 10 mg/0.5 mL PnIj Inject 10 mg into the skin once a week. Every Saturday 6 mL 3    [Paused] atorvastatin (LIPITOR) 10 MG tablet Take 1 tablet (10 mg total) by mouth every evening. 90 tablet 3    [Paused] naproxen sodium (ANAPROX) 220 MG tablet Take 440 mg by mouth as needed (pain).      spironolactone (ALDACTONE) 100 MG tablet Take 1 tablet (100 mg total) by mouth once daily. 90 tablet 3     No current facility-administered medications on file prior to visit.     "

## 2025-08-26 ENCOUNTER — OFFICE VISIT (OUTPATIENT)
Dept: FAMILY MEDICINE | Facility: CLINIC | Age: 55
End: 2025-08-26
Payer: COMMERCIAL

## 2025-08-26 ENCOUNTER — LAB VISIT (OUTPATIENT)
Dept: LAB | Facility: HOSPITAL | Age: 55
End: 2025-08-26
Attending: STUDENT IN AN ORGANIZED HEALTH CARE EDUCATION/TRAINING PROGRAM
Payer: COMMERCIAL

## 2025-08-26 VITALS
SYSTOLIC BLOOD PRESSURE: 124 MMHG | WEIGHT: 228.19 LBS | HEIGHT: 65 IN | TEMPERATURE: 98 F | HEART RATE: 72 BPM | RESPIRATION RATE: 18 BRPM | DIASTOLIC BLOOD PRESSURE: 78 MMHG | BODY MASS INDEX: 38.02 KG/M2 | OXYGEN SATURATION: 98 %

## 2025-08-26 DIAGNOSIS — Z01.818 PREOP EXAMINATION: Primary | ICD-10-CM

## 2025-08-26 PROCEDURE — 3061F NEG MICROALBUMINURIA REV: CPT | Mod: CPTII,S$GLB,, | Performed by: STUDENT IN AN ORGANIZED HEALTH CARE EDUCATION/TRAINING PROGRAM

## 2025-08-26 PROCEDURE — 3078F DIAST BP <80 MM HG: CPT | Mod: CPTII,S$GLB,, | Performed by: STUDENT IN AN ORGANIZED HEALTH CARE EDUCATION/TRAINING PROGRAM

## 2025-08-26 PROCEDURE — 4010F ACE/ARB THERAPY RXD/TAKEN: CPT | Mod: CPTII,S$GLB,, | Performed by: STUDENT IN AN ORGANIZED HEALTH CARE EDUCATION/TRAINING PROGRAM

## 2025-08-26 PROCEDURE — 1159F MED LIST DOCD IN RCRD: CPT | Mod: CPTII,S$GLB,, | Performed by: STUDENT IN AN ORGANIZED HEALTH CARE EDUCATION/TRAINING PROGRAM

## 2025-08-26 PROCEDURE — 3008F BODY MASS INDEX DOCD: CPT | Mod: CPTII,S$GLB,, | Performed by: STUDENT IN AN ORGANIZED HEALTH CARE EDUCATION/TRAINING PROGRAM

## 2025-08-26 PROCEDURE — 99999 PR PBB SHADOW E&M-EST. PATIENT-LVL III: CPT | Mod: PBBFAC,,, | Performed by: STUDENT IN AN ORGANIZED HEALTH CARE EDUCATION/TRAINING PROGRAM

## 2025-08-26 PROCEDURE — 3066F NEPHROPATHY DOC TX: CPT | Mod: CPTII,S$GLB,, | Performed by: STUDENT IN AN ORGANIZED HEALTH CARE EDUCATION/TRAINING PROGRAM

## 2025-08-26 PROCEDURE — 3044F HG A1C LEVEL LT 7.0%: CPT | Mod: CPTII,S$GLB,, | Performed by: STUDENT IN AN ORGANIZED HEALTH CARE EDUCATION/TRAINING PROGRAM

## 2025-08-26 PROCEDURE — 3074F SYST BP LT 130 MM HG: CPT | Mod: CPTII,S$GLB,, | Performed by: STUDENT IN AN ORGANIZED HEALTH CARE EDUCATION/TRAINING PROGRAM

## 2025-08-26 PROCEDURE — 99214 OFFICE O/P EST MOD 30 MIN: CPT | Mod: 25,S$GLB,, | Performed by: STUDENT IN AN ORGANIZED HEALTH CARE EDUCATION/TRAINING PROGRAM

## 2025-08-26 PROCEDURE — 1160F RVW MEDS BY RX/DR IN RCRD: CPT | Mod: CPTII,S$GLB,, | Performed by: STUDENT IN AN ORGANIZED HEALTH CARE EDUCATION/TRAINING PROGRAM

## 2025-08-27 ENCOUNTER — TELEPHONE (OUTPATIENT)
Dept: FAMILY MEDICINE | Facility: CLINIC | Age: 55
End: 2025-08-27
Payer: COMMERCIAL